# Patient Record
Sex: FEMALE | Race: WHITE | NOT HISPANIC OR LATINO | Employment: PART TIME | URBAN - NONMETROPOLITAN AREA
[De-identification: names, ages, dates, MRNs, and addresses within clinical notes are randomized per-mention and may not be internally consistent; named-entity substitution may affect disease eponyms.]

---

## 2023-10-30 PROBLEM — I48.19 PERSISTENT ATRIAL FIBRILLATION (MULTI): Status: ACTIVE | Noted: 2023-10-30

## 2023-10-30 PROBLEM — I10 ESSENTIAL HYPERTENSION, BENIGN: Status: ACTIVE | Noted: 2023-10-30

## 2023-10-30 RX ORDER — AMLODIPINE BESYLATE 5 MG/1
5 TABLET ORAL DAILY
COMMUNITY
End: 2023-10-31 | Stop reason: ALTCHOICE

## 2023-10-30 RX ORDER — CLONIDINE HYDROCHLORIDE 0.1 MG/1
0.1 TABLET ORAL DAILY
COMMUNITY
End: 2024-02-28 | Stop reason: ALTCHOICE

## 2023-10-30 RX ORDER — VALACYCLOVIR HYDROCHLORIDE 1 G/1
1000 TABLET, FILM COATED ORAL AS NEEDED
COMMUNITY
End: 2024-02-28 | Stop reason: ALTCHOICE

## 2023-10-30 RX ORDER — METOPROLOL SUCCINATE 25 MG/1
25 TABLET, EXTENDED RELEASE ORAL DAILY
COMMUNITY
End: 2024-02-28 | Stop reason: ALTCHOICE

## 2023-10-31 ENCOUNTER — OFFICE VISIT (OUTPATIENT)
Dept: CARDIOLOGY | Facility: CLINIC | Age: 68
End: 2023-10-31
Payer: MEDICARE

## 2023-10-31 VITALS
BODY MASS INDEX: 17.07 KG/M2 | SYSTOLIC BLOOD PRESSURE: 138 MMHG | HEIGHT: 64 IN | DIASTOLIC BLOOD PRESSURE: 80 MMHG | HEART RATE: 60 BPM | WEIGHT: 100 LBS

## 2023-10-31 DIAGNOSIS — I10 ESSENTIAL HYPERTENSION, BENIGN: ICD-10-CM

## 2023-10-31 DIAGNOSIS — Z79.01 ANTICOAGULATED: ICD-10-CM

## 2023-10-31 DIAGNOSIS — I48.19 PERSISTENT ATRIAL FIBRILLATION (MULTI): Primary | ICD-10-CM

## 2023-10-31 PROCEDURE — 3079F DIAST BP 80-89 MM HG: CPT | Performed by: INTERNAL MEDICINE

## 2023-10-31 PROCEDURE — 1159F MED LIST DOCD IN RCRD: CPT | Performed by: INTERNAL MEDICINE

## 2023-10-31 PROCEDURE — 3075F SYST BP GE 130 - 139MM HG: CPT | Performed by: INTERNAL MEDICINE

## 2023-10-31 PROCEDURE — 1160F RVW MEDS BY RX/DR IN RCRD: CPT | Performed by: INTERNAL MEDICINE

## 2023-10-31 PROCEDURE — 3008F BODY MASS INDEX DOCD: CPT | Performed by: INTERNAL MEDICINE

## 2023-10-31 PROCEDURE — 1036F TOBACCO NON-USER: CPT | Performed by: INTERNAL MEDICINE

## 2023-10-31 PROCEDURE — 99214 OFFICE O/P EST MOD 30 MIN: CPT | Performed by: INTERNAL MEDICINE

## 2023-10-31 ASSESSMENT — ENCOUNTER SYMPTOMS
IRREGULAR HEARTBEAT: 1
PALPITATIONS: 1

## 2023-10-31 NOTE — PROGRESS NOTES
"Subjective   Monse Dillon is a 68 y.o. female       Chief Complaint    Follow-up          HPI   Patient is here for follow-up continue management for persistent atrial fibrillation, last time I saw her we put her back on metoprolol and continued Xarelto.  Her GFR based on her body weight, age and creatinine is 30 and she is currently on Xarelto 15 mg daily.  Initially she was placed on Xarelto she had GI bleed and she recently underwent colonoscopy and given the clearance.  She has been on Xarelto for only 1 week.     Assessment     1. Persistent atrial fibrillation.  Xarelto treatment was interrupted due to GI bleed.  She was just given the permission to resume Xarelto by GI about a week ago.  She is on appropriate Xarelto dose based on her estimated GFR and body weight  2. Patient was started on anticoagulation with appropriate dose of Xarelto based on GFR  3. CHADS2 vascular score of 3   4 hypertension controlled     Plan     1. I advised the patient to continue present medical regimen.  I advised the patient that she can try to stop her amlodipine to see her blood pressure remains controlled with a combination of clonidine and metoprolol.  I advised her to continue to monitor her blood pressure at home  2.  I recommended for her to undergo cardioversion in 4 weeks.  Risk, benefit alternative cardioversion discussed with patient she understood and agreed  3. Risk, benefits alternative anticoagulation cardioversion discussed with patient at length she understood and agreed  4.  I reviewed her recent lab work and echocardiogram with her.  Review of Systems   Cardiovascular:  Positive for irregular heartbeat and palpitations.   All other systems reviewed and are negative.         Visit Vitals  /80 (BP Location: Left arm, Patient Position: Sitting)   Pulse 60   Ht 1.626 m (5' 4\")   Wt 45.4 kg (100 lb)   BMI 17.16 kg/m²   Smoking Status Former   BSA 1.43 m²        Objective   Physical Exam  Constitutional:     "   Appearance: Normal appearance. She is normal weight.   HENT:      Nose: Nose normal.   Neck:      Vascular: No carotid bruit.   Cardiovascular:      Rate and Rhythm: Normal rate. Rhythm irregular.      Pulses: Normal pulses.      Heart sounds: Normal heart sounds.   Pulmonary:      Effort: Pulmonary effort is normal.   Abdominal:      General: Bowel sounds are normal.      Palpations: Abdomen is soft.   Genitourinary:     Rectum: Normal.   Musculoskeletal:         General: Normal range of motion.      Cervical back: Normal range of motion.      Right lower leg: No edema.      Left lower leg: No edema.   Skin:     General: Skin is warm and dry.   Neurological:      General: No focal deficit present.      Mental Status: She is alert.   Psychiatric:         Mood and Affect: Mood normal.         Behavior: Behavior normal.         Thought Content: Thought content normal.         Judgment: Judgment normal.         Current Medications    Current Outpatient Medications:     cloNIDine (Catapres) 0.1 mg tablet, Take 1 tablet (0.1 mg) by mouth once daily., Disp: , Rfl:     metoprolol succinate XL (Toprol-XL) 25 mg 24 hr tablet, Take 1 tablet (25 mg) by mouth once daily. Do not crush or chew., Disp: , Rfl:     rivaroxaban (Xarelto) 15 mg tablet, Take 1 tablet (15 mg) by mouth once daily in the evening. Take with meals. Take with food., Disp: , Rfl:     valACYclovir (Valtrex) 1 gram tablet, Take 1 tablet (1,000 mg) by mouth if needed (flare ups)., Disp: , Rfl:                      Assessment/Plan   1. Persistent atrial fibrillation (CMS/HCC)      2. Essential hypertension, benign      3. BMI less than 19,adult      4. Anticoagulated

## 2023-10-31 NOTE — PATIENT INSTRUCTIONS
Please bring all medicines, vitamins, and herbal supplements with you when you come to the office.    Prescriptions will not be filled unless you are compliant with your follow up appointments or have a follow up appointment scheduled as per instruction of your physician. Refills should be requested at the time of your visit.    Patient can stop amlodipine monitor bp at home

## 2023-11-09 DIAGNOSIS — I48.19 PERSISTENT ATRIAL FIBRILLATION (MULTI): Primary | ICD-10-CM

## 2023-11-21 LAB
NON-UH HIE ANION GAP: 9.5 (ref 6–15)
NON-UH HIE CARBON DIOXIDE: 26.9 MMOL/L (ref 21–31)
NON-UH HIE CHLORIDE: 109 MMOL/L (ref 98–107)
NON-UH HIE POTASSIUM: 4.4 MMOL/L (ref 3.5–5.1)
NON-UH HIE SODIUM: 141 MMOL/L (ref 136–145)

## 2024-01-22 ENCOUNTER — TELEPHONE (OUTPATIENT)
Dept: CARDIOLOGY | Facility: CLINIC | Age: 69
End: 2024-01-22

## 2024-01-22 DIAGNOSIS — I48.91 ATRIAL FIBRILLATION, UNSPECIFIED TYPE (MULTI): ICD-10-CM

## 2024-01-22 NOTE — TELEPHONE ENCOUNTER
Patient seen on consult at [facility] on 1/18/2024; Fibrillation.    Plan  Patient can be discharged home on current medication  Assessment  1.  Recurrent/persistent atrial fibrillation with RVR.  Heart rate remains slightly high  2.  Mildly elevated troponin I suspect likely type II event due to atrial fibrillation and severe anemia  3.  Severe anemia.  Patient underwent previous GI evaluation with finding of colonic polyps and hemorrhoids  4.  Hypertension  5.  Moderate mitral regurgitation    Plan    1.  No anticoagulation in view of recurrent bleeding  2.  Patient can be discharged home  3.  Outpatient evaluation for Watchman device- will make referral to

## 2024-01-30 ENCOUNTER — APPOINTMENT (OUTPATIENT)
Dept: CARDIOLOGY | Facility: CLINIC | Age: 69
End: 2024-01-30
Payer: MEDICARE

## 2024-02-28 ENCOUNTER — OFFICE VISIT (OUTPATIENT)
Dept: CARDIOLOGY | Facility: CLINIC | Age: 69
End: 2024-02-28
Payer: MEDICARE

## 2024-02-28 VITALS
HEIGHT: 64 IN | DIASTOLIC BLOOD PRESSURE: 88 MMHG | SYSTOLIC BLOOD PRESSURE: 144 MMHG | WEIGHT: 99 LBS | BODY MASS INDEX: 16.9 KG/M2 | HEART RATE: 72 BPM

## 2024-02-28 DIAGNOSIS — Z79.01 ANTICOAGULATED: ICD-10-CM

## 2024-02-28 DIAGNOSIS — K92.2 RECURRENT GASTROINTESTINAL HEMORRHAGE: ICD-10-CM

## 2024-02-28 DIAGNOSIS — Z87.891 FORMER SMOKER: ICD-10-CM

## 2024-02-28 DIAGNOSIS — I10 ESSENTIAL HYPERTENSION, BENIGN: Primary | ICD-10-CM

## 2024-02-28 DIAGNOSIS — I48.19 PERSISTENT ATRIAL FIBRILLATION (MULTI): ICD-10-CM

## 2024-02-28 PROCEDURE — 93000 ELECTROCARDIOGRAM COMPLETE: CPT | Performed by: INTERNAL MEDICINE

## 2024-02-28 PROCEDURE — 3077F SYST BP >= 140 MM HG: CPT | Performed by: INTERNAL MEDICINE

## 2024-02-28 PROCEDURE — 1160F RVW MEDS BY RX/DR IN RCRD: CPT | Performed by: INTERNAL MEDICINE

## 2024-02-28 PROCEDURE — 1036F TOBACCO NON-USER: CPT | Performed by: INTERNAL MEDICINE

## 2024-02-28 PROCEDURE — 1159F MED LIST DOCD IN RCRD: CPT | Performed by: INTERNAL MEDICINE

## 2024-02-28 PROCEDURE — 3008F BODY MASS INDEX DOCD: CPT | Performed by: INTERNAL MEDICINE

## 2024-02-28 PROCEDURE — 99214 OFFICE O/P EST MOD 30 MIN: CPT | Performed by: INTERNAL MEDICINE

## 2024-02-28 PROCEDURE — 3079F DIAST BP 80-89 MM HG: CPT | Performed by: INTERNAL MEDICINE

## 2024-02-28 RX ORDER — SOTALOL HYDROCHLORIDE 120 MG/1
120 TABLET ORAL EVERY 12 HOURS
COMMUNITY

## 2024-02-28 RX ORDER — DIGOXIN 125 MCG
125 TABLET ORAL DAILY
COMMUNITY

## 2024-02-28 NOTE — PATIENT INSTRUCTIONS
BMI was below normal measurement. Current weight: (!) 44.9 kg (99 lb)  Weight change since last visit (-) denotes wt loss -1 lbs   Weight gain needed to achieve  BMI of 18.5 = 8.5 Lbs        Please bring all medicines, vitamins, and herbal supplements with you when you come to the office.    Prescriptions will not be filled unless you are compliant with your follow up appointments or have a follow up appointment scheduled as per instruction of your physician. Refills should be requested at the time of your visit.

## 2024-02-28 NOTE — PROGRESS NOTES
"Subjective   Monse Dillon is a 68 y.o. female       Chief Complaint    Hospital Follow-up          HPI          Patient is here for follow-up continue management for persistent atrial fibrillation, and recent hospitalization.  Unfortunately she presented with severe anemia and GI bleed and recurrence of her atrial fibrillation.  She was placed on sotalol and hopefully she will convert spontaneously to normal sinus rhythm.  And I also advised her to stop her anticoagulation due to the severity of her anemia.  She was seen by GI and underwent colonoscopy with diagnosis apparently with hemorrhoids.  The patient had repeat CBC recently which apparently was satisfactory.  I did refer her for evaluation for Watchman device.    Assessment     1. Persistent atrial fibrillation.  Xarelto treatment was interrupted due to GI bleed And now on 2 prior occasion.  2.  Intolerance to anticoagulation due to GI bleed  3. CHADS2 vascular score of 3   4 hypertension controlled     Plan     1. I advised the patient to continue present medical regimen.  She remains off anticoagulation anticipating Watchman device in the near future.  2.  I advised her to discuss with her GI physician if her hemorrhoids can be addressed which might allow us to use a short course of anticoagulation to perform cardioversion.  Patient will notify me when she see her GI specialist in the near future  3. Risk, benefits alternative of Watchman device discussed with her at length she understood and agreed  4.  I reviewed her recent hospitalization  5.  I will see her back in 3 to 4 months and follow-up    Review of Systems   All other systems reviewed and are negative.           Vitals:    02/28/24 1515   BP: 144/88   BP Location: Left arm   Patient Position: Sitting   Pulse: 72   Weight: (!) 44.9 kg (99 lb)   Height: 1.626 m (5' 4\")        EKG done in office today     Objective   Physical Exam  Constitutional:       Appearance: Normal appearance.   HENT:      " Nose: Nose normal.   Neck:      Vascular: No carotid bruit.   Cardiovascular:      Rate and Rhythm: Normal rate. Rhythm irregularly irregular.      Pulses: Normal pulses.      Heart sounds: Normal heart sounds.   Pulmonary:      Effort: Pulmonary effort is normal.   Abdominal:      General: Bowel sounds are normal.      Palpations: Abdomen is soft.   Musculoskeletal:         General: Normal range of motion.      Cervical back: Normal range of motion.      Right lower leg: No edema.      Left lower leg: No edema.   Skin:     General: Skin is warm and dry.   Neurological:      General: No focal deficit present.      Mental Status: She is alert.   Psychiatric:         Mood and Affect: Mood normal.         Behavior: Behavior normal.         Thought Content: Thought content normal.         Judgment: Judgment normal.         Allergies  Patient has no known allergies.     Current Medications    Current Outpatient Medications:     digoxin (Lanoxin) 125 MCG tablet, Take 1 tablet (125 mcg) by mouth once daily., Disp: , Rfl:     sotalol (Betapace) 120 mg tablet, Take 1 tablet (120 mg) by mouth every 12 hours., Disp: , Rfl:     rivaroxaban (Xarelto) 15 mg tablet, Take 1 tablet (15 mg) by mouth once daily in the evening. Take with meals. Take with food. (Patient not taking: Reported on 2/28/2024), Disp: 90 tablet, Rfl: 3                     Assessment/Plan   1. Essential hypertension, benign        2. Persistent atrial fibrillation (CMS/HCC)  Follow Up In Cardiology    Follow Up In Cardiology    ECG 12 Lead      3. Anticoagulated  Follow Up In Cardiology      4. Former smoker        5. Recurrent gastrointestinal hemorrhage                 Scribe Attestation  By signing my name below, Kitty GOODWIN LPN, Scribe   attest that this documentation has been prepared under the direction and in the presence of Jamel Cuevas MD.     Provider Attestation - Scribe documentation    All medical record entries made by the Scribe were at  my direction and personally dictated by me. I have reviewed the chart and agree that the record accurately reflects my personal performance of the history, physical exam, discussion and plan.

## 2024-02-28 NOTE — LETTER
February 28, 2024     Maria Luz Polanco, APRN-CNP  [address]  [facility]  [place] [address]    Patient: Monse Dillon   YOB: 1955   Date of Visit: 2/28/2024       Dear Dr. Maria Luz Polanco, APRN-CNP:    Thank you for referring Monse Dillon to me for evaluation. Below are my notes for this consultation.  If you have questions, please do not hesitate to call me. I look forward to following your patient along with you.       Sincerely,     Jamel Cuevas MD      CC: No Recipients  ______________________________________________________________________________________    Subjective   Monse Dillon is a 68 y.o. female       Chief Complaint    Hospital Follow-up          HPI          Patient is here for follow-up continue management for persistent atrial fibrillation, and recent hospitalization.  Unfortunately she presented with severe anemia and GI bleed and recurrence of her atrial fibrillation.  She was placed on sotalol and hopefully she will convert spontaneously to normal sinus rhythm.  And I also advised her to stop her anticoagulation due to the severity of her anemia.  She was seen by GI and underwent colonoscopy with diagnosis apparently with hemorrhoids.  The patient had repeat CBC recently which apparently was satisfactory.  I did refer her for evaluation for Watchman device.    Assessment     1. Persistent atrial fibrillation.  Xarelto treatment was interrupted due to GI bleed And now on 2 prior occasion.  2.  Intolerance to anticoagulation due to GI bleed  3. CHADS2 vascular score of 3   4 hypertension controlled     Plan     1. I advised the patient to continue present medical regimen.  She remains off anticoagulation anticipating Watchman device in the near future.  2.  I advised her to discuss with her GI physician if her hemorrhoids can be addressed which might allow us to use a short course of anticoagulation to perform cardioversion.  Patient will  "notify me when she see her GI specialist in the near future  3. Risk, benefits alternative of Watchman device discussed with her at length she understood and agreed  4.  I reviewed her recent hospitalization  5.  I will see her back in 3 to 4 months and follow-up    Review of Systems   All other systems reviewed and are negative.           Vitals:    02/28/24 1515   BP: 144/88   BP Location: Left arm   Patient Position: Sitting   Pulse: 72   Weight: (!) 44.9 kg (99 lb)   Height: 1.626 m (5' 4\")        EKG done in office today     Objective   Physical Exam  Constitutional:       Appearance: Normal appearance.   HENT:      Nose: Nose normal.   Neck:      Vascular: No carotid bruit.   Cardiovascular:      Rate and Rhythm: Normal rate. Rhythm irregularly irregular.      Pulses: Normal pulses.      Heart sounds: Normal heart sounds.   Pulmonary:      Effort: Pulmonary effort is normal.   Abdominal:      General: Bowel sounds are normal.      Palpations: Abdomen is soft.   Musculoskeletal:         General: Normal range of motion.      Cervical back: Normal range of motion.      Right lower leg: No edema.      Left lower leg: No edema.   Skin:     General: Skin is warm and dry.   Neurological:      General: No focal deficit present.      Mental Status: She is alert.   Psychiatric:         Mood and Affect: Mood normal.         Behavior: Behavior normal.         Thought Content: Thought content normal.         Judgment: Judgment normal.         Allergies  Patient has no known allergies.     Current Medications    Current Outpatient Medications:   •  digoxin (Lanoxin) 125 MCG tablet, Take 1 tablet (125 mcg) by mouth once daily., Disp: , Rfl:   •  sotalol (Betapace) 120 mg tablet, Take 1 tablet (120 mg) by mouth every 12 hours., Disp: , Rfl:   •  rivaroxaban (Xarelto) 15 mg tablet, Take 1 tablet (15 mg) by mouth once daily in the evening. Take with meals. Take with food. (Patient not taking: Reported on 2/28/2024), Disp: 90 " tablet, Rfl: 3                     Assessment/Plan   1. Essential hypertension, benign        2. Persistent atrial fibrillation (CMS/HCC)  Follow Up In Cardiology    Follow Up In Cardiology    ECG 12 Lead      3. Anticoagulated  Follow Up In Cardiology      4. Former smoker        5. Recurrent gastrointestinal hemorrhage                 Scribe Attestation  By signing my name below, Kitty GOODWIN LPN, Scribe   attest that this documentation has been prepared under the direction and in the presence of Jamel Cuevas MD.     Provider Attestation - Scribe documentation    All medical record entries made by the Scribe were at my direction and personally dictated by me. I have reviewed the chart and agree that the record accurately reflects my personal performance of the history, physical exam, discussion and plan.

## 2024-04-09 NOTE — PROGRESS NOTES
Cardio: Dr. Faith GOODWIN was asked by Dr. Cuevas to evaluate this patient in consultation for evaluation of left atrial appendage closure.    The patient is a 68-year-old female with hypertension and paroxysmal atrial fibrillation diagnosed a year ago.  Patient underwent DC cardioversion in November 2023 and was initially anticoagulated with Xarelto and managed with a rhythm control strategy with sotalol.. The patient has normal LVEF with left ventricular ejection fraction of 55 to 60% based on transthoracic echocardiogram January 2024.  She has no history of significant valvular heart disease..    Patient has no prior history of TIA or CVA.    Unfortunately the patient's course has been notable for symptomatic anemia necessitating hospitalization in January 2024.  She underwent EGD and colonoscopy which was notable for internal hemorrhoids.  She is scheduled to have a capsule endoscopy in May.  She was managed with iron infusions but did not require packed red blood cell transfusion.  At the time of her admission for symptomatic anemia she was taken off of anticoagulant therapy.    She denies any overt melena, hematochezia or hematemesis.  Patient is currently feeling stronger.    Given the patient's significant severe symptomatic anemia,  the patient is referred for consideration of left atrial appendage closure for stroke risk reduction.       ROS:  Constitutional: fatigue with anemia.  Feeling better now.  Eyes: no acute eye problems, no blurred vision, no diplopia, no eye pain  ENT:  no acute hearing loss, no earache, no sore throat  Cardiovascular: no dyspnea on exertion, no chest pain  Respiratory: no chronic cough, not coughing up sputum, no wheezing that is consistent with asthma  Gastrointestinal: no acute bowel complaints  Musculoskeletal: no acute arthralgias, no acute myalgias, no acute joint swelling  Skin: no skin rashes, no change in skin color and pigmentation, no skin lesions and no skin  lumps.   Neurological: no headaches, no dizziness, no tingling, no fainting and no limb weakness.   Psychiatric:  no suicidal ideation, no confusion, no personality change and no emotional problems.   Hematologic/Lymphatic: As noted above, other then mentioned in HPI  All other systems have been reviewed and are negative for complaint.     Physical Exam:     Visit Vitals  Smoking Status Former        Constitutional: alert and in no acute distress.   Eyes: no erythema, swelling or discharge from the eye .   Ears, Nose, Mouth, and Throat: external inspection of ears and nose is normal , lips, teeth, and gums are normal with good dentition  and oropharynx normal with no erythema, edema, exudate or lesions .   Neck: neck is supple, symmetric, trachea midline, no masses  and no thyromegaly .   Pulmonary: no increased work of breathing or signs of respiratory distress , lungs clear to auscultation. , normal percussion of chest  and chest palpation normal .   Cardiovascular: RRR, no murmur,  no leg edema, non-displaced PMI, no S3 or S4  Abdomen: abdomen non-tender, no masses  and no hepatomegaly .           Skin:  no skin lesions          Neurologic: non-focal neurologic examination.      Psychiatric judgment and insight is normal , oriented to person, place and time , normal mood and affect .       Labs:    Results for orders placed or performed in visit on 11/21/23   [facility] Electrolytes   Result Value Ref Range    [facility] Sodium 141 136 - 145 mmol/L    [facility] Potassium 4.4 3.5 - 5.1 mmol/L    [facility] Chloride 109 (H) 98 - 107 mmol/L    [facility] Carbon Dioxide 26.9 21.0 - 31.0 mmol/L    [facility] Anion Gap 9.5 6.0 - 15.0          Medications:    Current Outpatient Medications   Medication Instructions    digoxin (LANOXIN) 125 mcg, oral, Daily    rivaroxaban (XARELTO) 15 mg, oral, Daily with evening meal, Take with food.    sotalol (BETAPACE) 120 mg, oral, Every 12 hours          Assessment:      This is a  very pleasant 68-year-old female with paroxysmal atrial fibrillation hypertension and severe symptomatic anemia.  Etiology of anemia may be due to GI bleeding.  She seems to have improved after having been taken off of anticoagulant therapy and managed with iron infusions but of course is at risk for recurrent bleeding should she be placed back on anticoagulant therapy.    The CHADS-VASC score is 3 and HAS-BLED score is 4. The patient is at increased risk of both bleeding and stroke.  As such the patient is a reasonable candidate for consideration of left atrial appendage occluder placement.    Today we discussed the left atrial appendage closure procedure. The patient was given written educational handout materials and watched an educational video. All risks, benefits and alternative were discussed.     The risks discussed included but were not limited to vascular complications, sedation related complications, risk of MI, CVA, device embolization, pericardial tamponade and death. The patient verbalized understanding and decided to proceed.      Preprocedural planning will be performed with cardiac CT performed 1 week prior for device sizing and to exclude left atrial appendage thrombus.  Of course, the patient will also need a CT scan/HELEN 4 months after the procedure, to evaluate the device for position, thrombus and tim-device leak. Following device implant, strategy will be for dual antiplatelet therapy with aspirin and clopidogrel for 6 months then aspirin for life.     Thank you, Dr. Cuevas, for this consultation and for allowing me to participate in the care of this patient.

## 2024-04-10 ENCOUNTER — OFFICE VISIT (OUTPATIENT)
Dept: CARDIOLOGY | Facility: CLINIC | Age: 69
End: 2024-04-10
Payer: MEDICARE

## 2024-04-10 VITALS
BODY MASS INDEX: 16.73 KG/M2 | SYSTOLIC BLOOD PRESSURE: 151 MMHG | OXYGEN SATURATION: 98 % | DIASTOLIC BLOOD PRESSURE: 106 MMHG | HEART RATE: 87 BPM | RESPIRATION RATE: 16 BRPM | TEMPERATURE: 95.1 F | WEIGHT: 98 LBS | HEIGHT: 64 IN

## 2024-04-10 DIAGNOSIS — I48.91 ATRIAL FIBRILLATION, UNSPECIFIED TYPE (MULTI): ICD-10-CM

## 2024-04-10 PROCEDURE — 1159F MED LIST DOCD IN RCRD: CPT | Performed by: INTERNAL MEDICINE

## 2024-04-10 PROCEDURE — 3008F BODY MASS INDEX DOCD: CPT | Performed by: INTERNAL MEDICINE

## 2024-04-10 PROCEDURE — 99204 OFFICE O/P NEW MOD 45 MIN: CPT | Performed by: INTERNAL MEDICINE

## 2024-04-10 PROCEDURE — 1160F RVW MEDS BY RX/DR IN RCRD: CPT | Performed by: INTERNAL MEDICINE

## 2024-04-10 PROCEDURE — 99214 OFFICE O/P EST MOD 30 MIN: CPT | Performed by: INTERNAL MEDICINE

## 2024-04-10 PROCEDURE — 3077F SYST BP >= 140 MM HG: CPT | Performed by: INTERNAL MEDICINE

## 2024-04-10 PROCEDURE — 3080F DIAST BP >= 90 MM HG: CPT | Performed by: INTERNAL MEDICINE

## 2024-04-10 PROCEDURE — 1036F TOBACCO NON-USER: CPT | Performed by: INTERNAL MEDICINE

## 2024-04-12 ENCOUNTER — TELEPHONE (OUTPATIENT)
Dept: CARDIOLOGY | Facility: HOSPITAL | Age: 69
End: 2024-04-12
Payer: MEDICARE

## 2024-04-16 ENCOUNTER — TELEPHONE (OUTPATIENT)
Dept: CARDIOLOGY | Facility: HOSPITAL | Age: 69
End: 2024-04-16
Payer: MEDICARE

## 2024-04-16 NOTE — TELEPHONE ENCOUNTER
RN called regarding scheduling [facility] and pt stated she cannot proceed at the moment due to an active bleed and would like to follow up in early MAY, RN will follow up.

## 2024-05-21 ENCOUNTER — OFFICE VISIT (OUTPATIENT)
Dept: CARDIOLOGY | Facility: CLINIC | Age: 69
End: 2024-05-21
Payer: MEDICARE

## 2024-05-21 VITALS
HEIGHT: 64 IN | DIASTOLIC BLOOD PRESSURE: 76 MMHG | SYSTOLIC BLOOD PRESSURE: 134 MMHG | HEART RATE: 59 BPM | BODY MASS INDEX: 16.87 KG/M2 | WEIGHT: 98.8 LBS

## 2024-05-21 DIAGNOSIS — K92.2 RECURRENT GASTROINTESTINAL HEMORRHAGE: ICD-10-CM

## 2024-05-21 DIAGNOSIS — Z87.891 FORMER SMOKER: ICD-10-CM

## 2024-05-21 DIAGNOSIS — I10 ESSENTIAL HYPERTENSION, BENIGN: Primary | ICD-10-CM

## 2024-05-21 DIAGNOSIS — Z79.01 ANTICOAGULATED: ICD-10-CM

## 2024-05-21 DIAGNOSIS — I48.19 PERSISTENT ATRIAL FIBRILLATION (MULTI): ICD-10-CM

## 2024-05-21 PROCEDURE — 99214 OFFICE O/P EST MOD 30 MIN: CPT | Performed by: INTERNAL MEDICINE

## 2024-05-21 PROCEDURE — 3008F BODY MASS INDEX DOCD: CPT | Performed by: INTERNAL MEDICINE

## 2024-05-21 PROCEDURE — 3078F DIAST BP <80 MM HG: CPT | Performed by: INTERNAL MEDICINE

## 2024-05-21 PROCEDURE — 1160F RVW MEDS BY RX/DR IN RCRD: CPT | Performed by: INTERNAL MEDICINE

## 2024-05-21 PROCEDURE — 1159F MED LIST DOCD IN RCRD: CPT | Performed by: INTERNAL MEDICINE

## 2024-05-21 PROCEDURE — 3075F SYST BP GE 130 - 139MM HG: CPT | Performed by: INTERNAL MEDICINE

## 2024-05-21 PROCEDURE — 93000 ELECTROCARDIOGRAM COMPLETE: CPT | Performed by: INTERNAL MEDICINE

## 2024-05-21 PROCEDURE — 1036F TOBACCO NON-USER: CPT | Performed by: INTERNAL MEDICINE

## 2024-05-21 NOTE — PROGRESS NOTES
"Subjective   Monse Dillon is a 68 y.o. female       Chief Complaint    Follow-up          HPI        Patient is here for follow-up and continue management for persistent atrial fibrillation with documented intolerance to anticoagulation.  I did refer her for evaluation for Watchman device and she was felt to be a good candidate.  Apparently she is waiting to get the permission from her gastroenterologist to be on aspirin and Plavix therapy.  She remained in atrial fibrillation.  She denies complaint of chest pain, palpitation, lightheadedness, dizziness or syncope.  His recent labs showed mild hyperlipidemia but I do not believe she meet the criteria for lipid therapy.    Assessment     1. Persistent atrial fibrillation.  Heart rate controlled.  Unable to tolerate anticoagulation due to recurrent GI bleed  2.  Intolerance to anticoagulation due to GI bleed  3. CHADS2 vascular score of 3   4 hypertension controlled     Plan     1. I advised the patient to continue present medical regimen.  S  2.  The patient was referred for Watchman device.  She is awaiting the permission from her gastroenterologist to be on aspirin and Plavix and she will notify Dr. Kessler as to the timing of proceeding with Watchman device  3. Risk, benefits alternative of Watchman device discussed with her at length she understood and agreed  4.  I reviewed her recent hospitalization  5.  I will see her back in 6 months  Review of Systems   All other systems reviewed and are negative.           Vitals:    05/21/24 1521   BP: 134/76   BP Location: Left arm   Patient Position: Sitting   Pulse: 59   Weight: (!) 44.8 kg (98 lb 12.8 oz)   Height: 1.626 m (5' 4\")      EKG done in office today     Objective   Physical Exam  Constitutional:       Appearance: Normal appearance.   HENT:      Nose: Nose normal.   Neck:      Vascular: No carotid bruit.   Cardiovascular:      Rate and Rhythm: Normal rate. Rhythm irregularly irregular.      Pulses: Normal " pulses.      Heart sounds: Normal heart sounds.   Pulmonary:      Effort: Pulmonary effort is normal.   Abdominal:      General: Bowel sounds are normal.      Palpations: Abdomen is soft.   Musculoskeletal:         General: Normal range of motion.      Cervical back: Normal range of motion.      Right lower leg: No edema.      Left lower leg: No edema.   Skin:     General: Skin is warm and dry.   Neurological:      General: No focal deficit present.      Mental Status: She is alert.   Psychiatric:         Mood and Affect: Mood normal.         Behavior: Behavior normal.         Thought Content: Thought content normal.         Judgment: Judgment normal.         Allergies  Patient has no known allergies.     Current Medications    Current Outpatient Medications:     digoxin (Lanoxin) 125 MCG tablet, Take 1 tablet (125 mcg) by mouth once daily., Disp: , Rfl:     sotalol (Betapace) 120 mg tablet, Take 1 tablet (120 mg) by mouth every 12 hours., Disp: , Rfl:     rivaroxaban (Xarelto) 15 mg tablet, Take 1 tablet (15 mg) by mouth once daily in the evening. Take with meals. Take with food. (Patient not taking: Reported on 2/28/2024), Disp: 90 tablet, Rfl: 3                     Assessment/Plan   1. Essential hypertension, benign        2. Persistent atrial fibrillation (Multi)  Follow Up In Cardiology    Follow Up In Cardiology    ECG 12 Lead      3. Anticoagulated        4. BMI less than 19,adult        5. Recurrent gastrointestinal hemorrhage        6. Former smoker                 Scribe Attestation  By signing my name below, Vivian GOODWIN LPN, Scribe   attest that this documentation has been prepared under the direction and in the presence of Jamel Cuevas MD.     Provider Attestation - Scribe documentation    All medical record entries made by the Scribe were at my direction and personally dictated by me. I have reviewed the chart and agree that the record accurately reflects my personal performance of the history,  physical exam, discussion and plan.                                   Shared Decision Tool - Referral for Left Atrial Appendage Occlusion    My patient Monse Dillon 1955 has been evaluated by me and is referred to [facility] for a left atrial appendage implant due thromboembolic stroke risk from atrial fibrillation with CHADS2 score >= 2 or a [id number] score >= 3.    This patient is not a good candidate for long term anticoagulation for the following reason(s):    This patient however should be able to tolerate short term anticoagulation as necessary for LAAO device implant.  My Patient and I, the referring physician, have reviewed the following:  Yes  Atrial Fibrillation increases the risk of stroke.  Yes Anticoagulants or blood thinners help reduce the risk of stroke for most people.  Yes    Blood thinners may cause minor or serious bleeding issues.  Yes  Blood thinners include the medications aspirin, warfarin, and NOAC (dabigatran, edoxaban, rivaroxaban, apixaban...), each with unique risk and safety profiles.  Yes We reviewed his/her anticoagulation history and previous experiences.  Yes We discussed lack of other alternative treatments available to prevent stroke risk.  Yes We discussed the LAAO device implant vs taking anticoagulation to reduce stroke risk.  Yes We referred the patient to a LAAO outpatient clinic for further explanation and consideration.          Yes The LAAO device has been adequately explained along with the risks and benefits of treatment. Alternative treatment has been discussed with all questions answered. After discussion of the above considerations, it has been decided to be evaluated for the LAAO therapies.    Reviewed and approved by  on 1/14/25 at 1:40 PM.

## 2024-05-21 NOTE — PATIENT INSTRUCTIONS
Please bring all medicines, vitamins, and herbal supplements with you when you come to the office.    Prescriptions will not be filled unless you are compliant with your follow up appointments or have a follow up appointment scheduled as per instruction of your physician. Refills should be requested at the time of your visit.     BMI was below normal measurement. Current weight: (!) 44.8 kg (98 lb 12.8 oz)  Weight change since last visit (-) denotes wt loss 0.8 lbs   Weight gain needed to achieve  BMI of 18.5 = 8.7 Lbs    Provided dietary education for weight gain to the patient.

## 2024-05-21 NOTE — LETTER
May 21, 2024     Maria Luz Polanco, APRN-CNP  [address]  [facility]  [place] [address]    Patient: Monse Dillon   YOB: 1955   Date of Visit: 5/21/2024       Dear Dr. Maria Luz Polanco, APRN-CNP:    Thank you for referring Monse Dillon to me for evaluation. Below are my notes for this consultation.  If you have questions, please do not hesitate to call me. I look forward to following your patient along with you.       Sincerely,     Jamel Cuevas MD      CC: No Recipients  ______________________________________________________________________________________    Subjective   Monse Dillon is a 68 y.o. female       Chief Complaint    Follow-up          HPI        Patient is here for follow-up and continue management for persistent atrial fibrillation with documented intolerance to anticoagulation.  I did refer her for evaluation for Watchman device and she was felt to be a good candidate.  Apparently she is waiting to get the permission from her gastroenterologist to be on aspirin and Plavix therapy.  She remained in atrial fibrillation.  She denies complaint of chest pain, palpitation, lightheadedness, dizziness or syncope.  His recent labs showed mild hyperlipidemia but I do not believe she meet the criteria for lipid therapy.    Assessment     1. Persistent atrial fibrillation.  Heart rate controlled.  Unable to tolerate anticoagulation due to recurrent GI bleed  2.  Intolerance to anticoagulation due to GI bleed  3. CHADS2 vascular score of 3   4 hypertension controlled     Plan     1. I advised the patient to continue present medical regimen.  S  2.  The patient was referred for Watchman device.  She is awaiting the permission from her gastroenterologist to be on aspirin and Plavix and she will notify Dr. Kessler as to the timing of proceeding with Watchman device  3. Risk, benefits alternative of Watchman device discussed with her at length she understood and  "agreed  4.  I reviewed her recent hospitalization  5.  I will see her back in 6 months  Review of Systems   All other systems reviewed and are negative.           Vitals:    05/21/24 1521   BP: 134/76   BP Location: Left arm   Patient Position: Sitting   Pulse: 59   Weight: (!) 44.8 kg (98 lb 12.8 oz)   Height: 1.626 m (5' 4\")      EKG done in office today     Objective   Physical Exam  Constitutional:       Appearance: Normal appearance.   HENT:      Nose: Nose normal.   Neck:      Vascular: No carotid bruit.   Cardiovascular:      Rate and Rhythm: Normal rate. Rhythm irregularly irregular.      Pulses: Normal pulses.      Heart sounds: Normal heart sounds.   Pulmonary:      Effort: Pulmonary effort is normal.   Abdominal:      General: Bowel sounds are normal.      Palpations: Abdomen is soft.   Musculoskeletal:         General: Normal range of motion.      Cervical back: Normal range of motion.      Right lower leg: No edema.      Left lower leg: No edema.   Skin:     General: Skin is warm and dry.   Neurological:      General: No focal deficit present.      Mental Status: She is alert.   Psychiatric:         Mood and Affect: Mood normal.         Behavior: Behavior normal.         Thought Content: Thought content normal.         Judgment: Judgment normal.         Allergies  Patient has no known allergies.     Current Medications    Current Outpatient Medications:   •  digoxin (Lanoxin) 125 MCG tablet, Take 1 tablet (125 mcg) by mouth once daily., Disp: , Rfl:   •  sotalol (Betapace) 120 mg tablet, Take 1 tablet (120 mg) by mouth every 12 hours., Disp: , Rfl:   •  rivaroxaban (Xarelto) 15 mg tablet, Take 1 tablet (15 mg) by mouth once daily in the evening. Take with meals. Take with food. (Patient not taking: Reported on 2/28/2024), Disp: 90 tablet, Rfl: 3                     Assessment/Plan   1. Essential hypertension, benign        2. Persistent atrial fibrillation (Multi)  Follow Up In Cardiology    Follow Up " In Cardiology    ECG 12 Lead      3. Anticoagulated        4. BMI less than 19,adult        5. Recurrent gastrointestinal hemorrhage        6. Former smoker                 Scribe Attestation  By signing my name below, Vivian GOODWIN LPN, Scribe   attest that this documentation has been prepared under the direction and in the presence of Jamel Cuevas MD.     Provider Attestation - Scribe documentation    All medical record entries made by the Scribe were at my direction and personally dictated by me. I have reviewed the chart and agree that the record accurately reflects my personal performance of the history, physical exam, discussion and plan.

## 2024-05-29 ENCOUNTER — TELEPHONE (OUTPATIENT)
Dept: CARDIOLOGY | Facility: CLINIC | Age: 69
End: 2024-05-29
Payer: MEDICARE

## 2024-05-29 DIAGNOSIS — I48.19 PERSISTENT ATRIAL FIBRILLATION (MULTI): ICD-10-CM

## 2024-05-29 DIAGNOSIS — Z79.01 ANTICOAGULATED: ICD-10-CM

## 2024-05-29 NOTE — TELEPHONE ENCOUNTER
Patient phoned recently had surgical procedure done with , he cleared he to take bllod thinners again. Is scheduled to had Watchman within the next month. Inquiring if she should start blood thinner again since she is now cleared to take.   Records requested.     To Dr. Jamel Cuevas MD for review.

## 2024-05-30 RX ORDER — CLOPIDOGREL BISULFATE 75 MG/1
75 TABLET ORAL DAILY
Qty: 30 TABLET | Refills: 11 | Status: SHIPPED | OUTPATIENT
Start: 2024-05-30 | End: 2025-05-30

## 2024-05-30 RX ORDER — ASPIRIN 81 MG/1
81 TABLET ORAL DAILY
COMMUNITY

## 2024-06-07 ENCOUNTER — TELEPHONE (OUTPATIENT)
Dept: CARDIOLOGY | Facility: HOSPITAL | Age: 69
End: 2024-06-07
Payer: MEDICARE

## 2024-06-07 DIAGNOSIS — I48.91 ATRIAL FIBRILLATION, UNSPECIFIED TYPE (MULTI): ICD-10-CM

## 2024-07-05 ENCOUNTER — HOSPITAL ENCOUNTER (OUTPATIENT)
Dept: RADIOLOGY | Facility: CLINIC | Age: 69
Discharge: HOME | End: 2024-07-05
Payer: MEDICARE

## 2024-07-05 DIAGNOSIS — I48.91 ATRIAL FIBRILLATION, UNSPECIFIED TYPE (MULTI): ICD-10-CM

## 2024-07-05 PROCEDURE — 75572 CT HRT W/3D IMAGE: CPT

## 2024-07-05 PROCEDURE — 2500000004 HC RX 250 GENERAL PHARMACY W/ HCPCS (ALT 636 FOR OP/ED): Performed by: INTERNAL MEDICINE

## 2024-07-05 PROCEDURE — 2550000001 HC RX 255 CONTRASTS: Performed by: INTERNAL MEDICINE

## 2024-07-08 PROBLEM — I48.91 ATRIAL FIBRILLATION, UNSPECIFIED TYPE (MULTI): Status: ACTIVE | Noted: 2024-07-08

## 2024-07-08 NOTE — DISCHARGE INSTRUCTIONS
Anticoagulation Plan: Plavix and 81mg Aspirin for 6 months, then aspirin for life    Do not drive or lift anything heavier than 10lbs for 1 week, or until groin is healed    You will have CT imaging completed in 4 months to assess the effectiveness of the Watchman Device. You will receive a call (within 45 days) in regards to timing of 4 month CT.     Please follow up with your primary cardiologist or PCP in 1-2 weeks     Wound Care:  If slight bleeding should occur at site, lie down and have someone apply firm pressure just above the puncture site for 5 minutes.  If it continues or is profuse, call 911. Always notify your doctor if bleeding occurs.   Keep site clean and dry. Let air dry or you may use a simple bandaid.   Gently cleanse the puncture site in your groin with soap and water only.   You may experience some tenderness, bruising or minimal inflammation.  If you have any concerns, you may contact the Cath Lab or if any of these symptoms become excessive, contact your cardiologist or go to the emergency room.   No tub baths, soaking, or swimming for one week.   May shower the next day after your procedure.    No elective dental procedures or cleanings for 3 months post procedure  You will need dental prophylaxis prior to dental work/cleanings for 6 months     Please call our nurse line for any questions or concerns: [phone]

## 2024-07-12 ENCOUNTER — APPOINTMENT (OUTPATIENT)
Dept: CARDIOLOGY | Facility: HOSPITAL | Age: 69
DRG: 274 | End: 2024-07-12
Payer: MEDICARE

## 2024-07-12 ENCOUNTER — HOSPITAL ENCOUNTER (OUTPATIENT)
Facility: HOSPITAL | Age: 69
Setting detail: SURGERY ADMIT
Discharge: HOME | DRG: 274 | End: 2024-07-12
Attending: INTERNAL MEDICINE | Admitting: INTERNAL MEDICINE
Payer: MEDICARE

## 2024-07-12 VITALS
HEART RATE: 52 BPM | DIASTOLIC BLOOD PRESSURE: 84 MMHG | SYSTOLIC BLOOD PRESSURE: 148 MMHG | OXYGEN SATURATION: 99 % | RESPIRATION RATE: 17 BRPM

## 2024-07-12 DIAGNOSIS — Z95.818 PRESENCE OF WATCHMAN LEFT ATRIAL APPENDAGE CLOSURE DEVICE: ICD-10-CM

## 2024-07-12 DIAGNOSIS — Z09 POSTOP CHECK: ICD-10-CM

## 2024-07-12 DIAGNOSIS — I48.11 LONGSTANDING PERSISTENT ATRIAL FIBRILLATION (MULTI): ICD-10-CM

## 2024-07-12 DIAGNOSIS — I48.91 ATRIAL FIBRILLATION, UNSPECIFIED TYPE (MULTI): Primary | ICD-10-CM

## 2024-07-12 DIAGNOSIS — Z01.810 PREOP CARDIOVASCULAR EXAM: ICD-10-CM

## 2024-07-12 LAB
ACT BLD: 345 SEC (ref 83–199)
ATRIAL RATE: 300 BPM
EJECTION FRACTION: 58 %
EJECTION FRACTION: 70 %
Q ONSET: 225 MS
QRS COUNT: 15 BEATS
QRS DURATION: 112 MS
QT INTERVAL: 414 MS
QTC CALCULATION(BAZETT): 500 MS
QTC FREDERICIA: 470 MS
R AXIS: 78 DEGREES
T AXIS: -53 DEGREES
T OFFSET: 432 MS
VENTRICULAR RATE: 88 BPM

## 2024-07-12 PROCEDURE — 7100000009 HC PHASE TWO TIME - INITIAL BASE CHARGE: Performed by: INTERNAL MEDICINE

## 2024-07-12 PROCEDURE — 85347 COAGULATION TIME ACTIVATED: CPT | Performed by: INTERNAL MEDICINE

## 2024-07-12 PROCEDURE — 99153 MOD SED SAME PHYS/QHP EA: CPT | Performed by: INTERNAL MEDICINE

## 2024-07-12 PROCEDURE — 2720000007 HC OR 272 NO HCPCS: Performed by: INTERNAL MEDICINE

## 2024-07-12 PROCEDURE — 93662 INTRACARDIAC ECG (ICE): CPT | Performed by: INTERNAL MEDICINE

## 2024-07-12 PROCEDURE — C1894 INTRO/SHEATH, NON-LASER: HCPCS | Performed by: INTERNAL MEDICINE

## 2024-07-12 PROCEDURE — 2500000004 HC RX 250 GENERAL PHARMACY W/ HCPCS (ALT 636 FOR OP/ED): Performed by: INTERNAL MEDICINE

## 2024-07-12 PROCEDURE — G0269 OCCLUSIVE DEVICE IN VEIN ART: HCPCS | Mod: TC,59 | Performed by: INTERNAL MEDICINE

## 2024-07-12 PROCEDURE — 99152 MOD SED SAME PHYS/QHP 5/>YRS: CPT | Performed by: INTERNAL MEDICINE

## 2024-07-12 PROCEDURE — 7100000010 HC PHASE TWO TIME - EACH INCREMENTAL 1 MINUTE: Performed by: INTERNAL MEDICINE

## 2024-07-12 PROCEDURE — 85347 COAGULATION TIME ACTIVATED: CPT

## 2024-07-12 PROCEDURE — 93308 TTE F-UP OR LMTD: CPT | Performed by: STUDENT IN AN ORGANIZED HEALTH CARE EDUCATION/TRAINING PROGRAM

## 2024-07-12 PROCEDURE — C1889 IMPLANT/INSERT DEVICE, NOC: HCPCS | Performed by: INTERNAL MEDICINE

## 2024-07-12 PROCEDURE — 1200000002 HC GENERAL ROOM WITH TELEMETRY DAILY

## 2024-07-12 PROCEDURE — 2500000005 HC RX 250 GENERAL PHARMACY W/O HCPCS: Performed by: INTERNAL MEDICINE

## 2024-07-12 PROCEDURE — 33340 PERQ CLSR TCAT L ATR APNDGE: CPT | Performed by: INTERNAL MEDICINE

## 2024-07-12 PROCEDURE — 2780000003 HC OR 278 NO HCPCS: Performed by: INTERNAL MEDICINE

## 2024-07-12 PROCEDURE — 02L73DK OCCLUSION OF LEFT ATRIAL APPENDAGE WITH INTRALUMINAL DEVICE, PERCUTANEOUS APPROACH: ICD-10-PCS | Performed by: INTERNAL MEDICINE

## 2024-07-12 PROCEDURE — 2550000001 HC RX 255 CONTRASTS: Performed by: INTERNAL MEDICINE

## 2024-07-12 PROCEDURE — 93308 TTE F-UP OR LMTD: CPT

## 2024-07-12 PROCEDURE — 93005 ELECTROCARDIOGRAM TRACING: CPT

## 2024-07-12 PROCEDURE — 2500000001 HC RX 250 WO HCPCS SELF ADMINISTERED DRUGS (ALT 637 FOR MEDICARE OP): Performed by: INTERNAL MEDICINE

## 2024-07-12 PROCEDURE — C1760 CLOSURE DEV, VASC: HCPCS | Performed by: INTERNAL MEDICINE

## 2024-07-12 PROCEDURE — C1893 INTRO/SHEATH, FIXED,NON-PEEL: HCPCS | Performed by: INTERNAL MEDICINE

## 2024-07-12 PROCEDURE — C1759 CATH, INTRA ECHOCARDIOGRAPHY: HCPCS | Performed by: INTERNAL MEDICINE

## 2024-07-12 DEVICE — LEFT ATRIAL APPENDAGE CLOSURE DEVICE WITH DELIVERY SYSTEM
Type: IMPLANTABLE DEVICE | Site: HEART | Status: FUNCTIONAL
Brand: WATCHMAN FLX™ PRO

## 2024-07-12 RX ORDER — ACETAMINOPHEN 325 MG/1
650 TABLET ORAL EVERY 6 HOURS PRN
COMMUNITY
Start: 2024-07-12

## 2024-07-12 RX ORDER — MIDAZOLAM HYDROCHLORIDE 1 MG/ML
INJECTION, SOLUTION INTRAMUSCULAR; INTRAVENOUS AS NEEDED
Status: DISCONTINUED | OUTPATIENT
Start: 2024-07-12 | End: 2024-07-12 | Stop reason: HOSPADM

## 2024-07-12 RX ORDER — CLOPIDOGREL BISULFATE 75 MG/1
75 TABLET ORAL DAILY
Status: DISCONTINUED | OUTPATIENT
Start: 2024-07-12 | End: 2024-07-12 | Stop reason: HOSPADM

## 2024-07-12 RX ORDER — CLOPIDOGREL BISULFATE 75 MG/1
TABLET ORAL AS NEEDED
Status: DISCONTINUED | OUTPATIENT
Start: 2024-07-12 | End: 2024-07-12 | Stop reason: HOSPADM

## 2024-07-12 RX ORDER — ASPIRIN 81 MG/1
81 TABLET ORAL DAILY
Status: DISCONTINUED | OUTPATIENT
Start: 2024-07-12 | End: 2024-07-12 | Stop reason: HOSPADM

## 2024-07-12 RX ORDER — PROCHLORPERAZINE MALEATE 10 MG
10 TABLET ORAL EVERY 6 HOURS PRN
Status: DISCONTINUED | OUTPATIENT
Start: 2024-07-12 | End: 2024-07-12 | Stop reason: HOSPADM

## 2024-07-12 RX ORDER — NAPROXEN SODIUM 220 MG/1
324 TABLET, FILM COATED ORAL ONCE
Status: DISCONTINUED | OUTPATIENT
Start: 2024-07-12 | End: 2024-07-12 | Stop reason: HOSPADM

## 2024-07-12 RX ORDER — PROCHLORPERAZINE EDISYLATE 5 MG/ML
10 INJECTION INTRAMUSCULAR; INTRAVENOUS EVERY 6 HOURS PRN
Status: DISCONTINUED | OUTPATIENT
Start: 2024-07-12 | End: 2024-07-12 | Stop reason: HOSPADM

## 2024-07-12 RX ORDER — LIDOCAINE HYDROCHLORIDE 20 MG/ML
INJECTION, SOLUTION INFILTRATION; PERINEURAL AS NEEDED
Status: DISCONTINUED | OUTPATIENT
Start: 2024-07-12 | End: 2024-07-12 | Stop reason: HOSPADM

## 2024-07-12 RX ORDER — TRAMADOL HYDROCHLORIDE 50 MG/1
50 TABLET ORAL EVERY 6 HOURS PRN
Status: DISCONTINUED | OUTPATIENT
Start: 2024-07-12 | End: 2024-07-12 | Stop reason: HOSPADM

## 2024-07-12 RX ORDER — HEPARIN SODIUM 1000 [USP'U]/ML
INJECTION, SOLUTION INTRAVENOUS; SUBCUTANEOUS AS NEEDED
Status: DISCONTINUED | OUTPATIENT
Start: 2024-07-12 | End: 2024-07-12 | Stop reason: HOSPADM

## 2024-07-12 RX ORDER — SODIUM CHLORIDE, SODIUM LACTATE, POTASSIUM CHLORIDE, CALCIUM CHLORIDE 600; 310; 30; 20 MG/100ML; MG/100ML; MG/100ML; MG/100ML
75 INJECTION, SOLUTION INTRAVENOUS CONTINUOUS
Status: SHIPPED | OUTPATIENT
Start: 2024-07-12 | End: 2024-07-12

## 2024-07-12 RX ORDER — ACETAMINOPHEN 325 MG/1
650 TABLET ORAL EVERY 6 HOURS PRN
Status: DISCONTINUED | OUTPATIENT
Start: 2024-07-12 | End: 2024-07-12 | Stop reason: HOSPADM

## 2024-07-12 RX ORDER — DIGOXIN 125 MCG
125 TABLET ORAL DAILY
Status: DISCONTINUED | OUTPATIENT
Start: 2024-07-13 | End: 2024-07-12 | Stop reason: HOSPADM

## 2024-07-12 RX ORDER — ACETAMINOPHEN 650 MG/1
650 SUPPOSITORY RECTAL EVERY 6 HOURS PRN
Status: DISCONTINUED | OUTPATIENT
Start: 2024-07-12 | End: 2024-07-12 | Stop reason: HOSPADM

## 2024-07-12 RX ORDER — ACETAMINOPHEN 160 MG/5ML
650 SOLUTION ORAL EVERY 6 HOURS PRN
Status: DISCONTINUED | OUTPATIENT
Start: 2024-07-12 | End: 2024-07-12 | Stop reason: HOSPADM

## 2024-07-12 RX ORDER — PROTAMINE SULFATE 10 MG/ML
INJECTION, SOLUTION INTRAVENOUS CONTINUOUS PRN
Status: COMPLETED | OUTPATIENT
Start: 2024-07-12 | End: 2024-07-12

## 2024-07-12 RX ORDER — DOCUSATE SODIUM 100 MG/1
100 CAPSULE, LIQUID FILLED ORAL 2 TIMES DAILY
Status: DISCONTINUED | OUTPATIENT
Start: 2024-07-12 | End: 2024-07-12 | Stop reason: HOSPADM

## 2024-07-12 RX ORDER — CEFAZOLIN 1 G/1
INJECTION, POWDER, FOR SOLUTION INTRAVENOUS AS NEEDED
Status: DISCONTINUED | OUTPATIENT
Start: 2024-07-12 | End: 2024-07-12 | Stop reason: HOSPADM

## 2024-07-12 RX ORDER — PANTOPRAZOLE SODIUM 40 MG/1
40 TABLET, DELAYED RELEASE ORAL
Status: DISCONTINUED | OUTPATIENT
Start: 2024-07-13 | End: 2024-07-12 | Stop reason: HOSPADM

## 2024-07-12 RX ORDER — CLOPIDOGREL BISULFATE 300 MG/1
600 TABLET, FILM COATED ORAL ONCE
Status: DISCONTINUED | OUTPATIENT
Start: 2024-07-12 | End: 2024-07-12 | Stop reason: HOSPADM

## 2024-07-12 RX ORDER — PROCHLORPERAZINE 25 MG/1
25 SUPPOSITORY RECTAL EVERY 12 HOURS PRN
Status: DISCONTINUED | OUTPATIENT
Start: 2024-07-12 | End: 2024-07-12 | Stop reason: HOSPADM

## 2024-07-12 RX ORDER — PANTOPRAZOLE SODIUM 40 MG/10ML
40 INJECTION, POWDER, LYOPHILIZED, FOR SOLUTION INTRAVENOUS
Status: DISCONTINUED | OUTPATIENT
Start: 2024-07-13 | End: 2024-07-12 | Stop reason: HOSPADM

## 2024-07-12 RX ORDER — SOTALOL HYDROCHLORIDE 120 MG/1
120 TABLET ORAL EVERY 12 HOURS
Status: DISCONTINUED | OUTPATIENT
Start: 2024-07-13 | End: 2024-07-12 | Stop reason: HOSPADM

## 2024-07-12 RX ORDER — FENTANYL CITRATE 50 UG/ML
INJECTION, SOLUTION INTRAMUSCULAR; INTRAVENOUS AS NEEDED
Status: DISCONTINUED | OUTPATIENT
Start: 2024-07-12 | End: 2024-07-12 | Stop reason: HOSPADM

## 2024-07-12 ASSESSMENT — COLUMBIA-SUICIDE SEVERITY RATING SCALE - C-SSRS
6. HAVE YOU EVER DONE ANYTHING, STARTED TO DO ANYTHING, OR PREPARED TO DO ANYTHING TO END YOUR LIFE?: NO
1. IN THE PAST MONTH, HAVE YOU WISHED YOU WERE DEAD OR WISHED YOU COULD GO TO SLEEP AND NOT WAKE UP?: NO
2. HAVE YOU ACTUALLY HAD ANY THOUGHTS OF KILLING YOURSELF?: NO

## 2024-07-12 NOTE — PRE-SEDATION DOCUMENTATION
Patient: Monse Dillon  MRN: [id number]      Physical Exam    Airway  Mallampati: IV     Cardiovascular    Dental    Pulmonary        Plan    ASA 3

## 2024-07-12 NOTE — H&P
History Of Present Illness  Monse Dillon is a 68 y.o. female presenting with  with hypertension and persitent atrial fibrillation diagnosed a year ago.  Patient underwent DC cardioversion in November 2023 and was initially anticoagulated with Xarelto and managed with a rhythm control strategy with sotalol.. The patient has normal LVEF with left ventricular ejection fraction of 55 to 60% based on transthoracic echocardiogram January 2024.  She has no history of significant valvular heart disease..     Patient has no prior history of TIA or CVA.     Unfortunately the patient's course has been notable for symptomatic anemia necessitating hospitalization in January 2024.  She underwent EGD and colonoscopy which was notable for internal hemorrhoids.  She underwent capsule endoscopy in May which demonstrated small bowell AVM and was treated by hemostasis.  She was managed with iron infusions but did not require packed red blood cell transfusion.  At the time of her admission for symptomatic anemia she was taken off of anticoagulant therapy. She is currently on aspirin and plavix.     Given the patient's significant severe symptomatic anemia,  the patient is admitted for left atrial appendage closure for stroke risk reduction.         ROS:  Constitutional: fatigue with anemia.  Feeling better now.  Eyes: no acute eye problems, no blurred vision, no diplopia, no eye pain  ENT:  no acute hearing loss, no earache, no sore throat  Cardiovascular: no dyspnea on exertion, no chest pain  Respiratory: no chronic cough, not coughing up sputum, no wheezing that is consistent with asthma  Gastrointestinal: no acute bowel complaints  Musculoskeletal: no acute arthralgias, no acute myalgias, no acute joint swelling  Skin: no skin rashes, no change in skin color and pigmentation, no skin lesions and no skin lumps.   Neurological: no headaches, no dizziness, no tingling, no fainting and no limb weakness.   Psychiatric:  no suicidal  ideation, no confusion, no personality change and no emotional problems.   Hematologic/Lymphatic: As noted above, other then mentioned in HPI  All other systems have been reviewed and are negative for complaint.      Physical Exam:     Constitutional: alert and in no acute distress.   Eyes: no erythema, swelling or discharge from the eye .   Ears, Nose, Mouth, and Throat: external inspection of ears and nose is normal , lips, teeth, and gums are normal with good dentition  and oropharynx normal with no erythema, edema, exudate or lesions .   Neck: neck is supple, symmetric, trachea midline, no masses  and no thyromegaly .   Pulmonary: no increased work of breathing or signs of respiratory distress , lungs clear to auscultation. , normal percussion of chest  and chest palpation normal .   Cardiovascular: irregular Ryhtm, no murmur,  no leg edema, non-displaced PMI, no S3 or S4  Abdomen: abdomen non-tender, no masses  and no hepatomegaly .           Skin:  no skin lesions          Neurologic: non-focal neurologic examination.      Psychiatric judgment and insight is normal , oriented to person, place and time , normal mood and affect .         Past Medical History  No past medical history on file.    Surgical History  Past Surgical History:   Procedure Laterality Date    COLONOSCOPY      COLONOSCOPY  10/2023        Social History  She reports that she has quit smoking. Her smoking use included cigarettes. She has never used smokeless tobacco. She reports current alcohol use. She reports that she does not use drugs.    Family History  Family History   Problem Relation Name Age of Onset    Other (Malignant Neoplasm of breast) Mother      Coronary artery disease Father          Allergies  Patient has no known allergies.       Plan:  LAAC   Aspirin loading  Plavix load post procedure  Bed rest, neuro/peripheral/groin checks per protocol  Limited ECHO post procedure  DAPT at discharge (Plavix x6 months, ASA 81mg for  life)  Follow-up with primary cards/PCP in 1-2 weeks  4 month Watchman CTA to reassess device  Pt to be added to registry  Likely discharge home today once recovery and monitoring period is complete.       Jay Franks MD

## 2024-07-12 NOTE — PROGRESS NOTES
Pharmacy Medication History Review    Monse Dillon is a 68 y.o. female admitted for Atrial fibrillation (Multi). Pharmacy reviewed the patient's [id number] medications and allergies for accuracy.    Medications ADDED:  N/A  Medications CHANGED:  N/A  Medications REMOVED:   N/A     The list below reflects the updated PTA list. Comments regarding how patient may be taking medications differently can be found in the Admit Orders Activity  Prior to Admission Medications   Prescriptions Last Dose Informant   aspirin 81 mg EC tablet 7/11/2024 at pm Self   Sig: Take 1 tablet (81 mg) by mouth once daily.   clopidogrel (Plavix) 75 mg tablet 7/11/2024 at pm Self   Sig: Take 1 tablet (75 mg) by mouth once daily.   digoxin (Lanoxin) 125 MCG tablet 7/11/2024 at am Self   Sig: Take 1 tablet (125 mcg) by mouth once daily.   sotalol (Betapace) 120 mg tablet 7/11/2024 at pm Self   Sig: Take 1 tablet (120 mg) by mouth every 12 hours.      Facility-Administered Medications: None       The list below reflects the updated allergy list. Please review each documented allergy for additional clarification and justification.  Allergies  Reviewed by Melonie Nixon RN on 7/5/2024   No Known Allergies         M2B service not offered prior to surgery, please reassess prior to patient discharge if Meds to Beds is desired.  Pharmacy has been updated to [facility] in [place].    Sources used to complete the med history include out patient fill history, OARRS, and patient interview   Reliable historian- detailed medication list at bedside for review    Below are additional concerns with the patient's PTA list.  N/A    Atul Sorensen PharmD  Transitions of Care Pharmacist   Meds Ambulatory and Retail Services  Please reach out via Secure Chat for questions, or if no response call [employer] or [facility]

## 2024-07-12 NOTE — DISCHARGE SUMMARY
STRUCT[facility] INPATIENT DISCHARGE SUMMARY    BRIEF OVERVIEW  Admitting Provider: Torrey Busch MD  Discharge Provider: Torrey Busch MD  Primary Care Physician at Discharge: Maria Luz Jennifer Kapthiago, APRN--[phone]     Admission Date: 7/12/2024     Discharge Date: 7/12/2024    Primary Discharge Diagnosis  Atrial fibrillation (Multi)    Discharge Disposition  Home    Active Issues Requiring Follow-up  Normal Watchman follow-up    Outpatient Follow-Up  Future Appointments   Date Time Provider Department Center   8/12/2024 10:40 AM Jamel Cuevas MD [id number] [place]   11/12/2024 10:15 AM ELY [id number] CT 1 [id number] [facility]   12/3/2024  2:50 PM Jamel Cuevas MD [id number] [place]       Results for orders placed or performed during the hospital encounter of 07/12/24 (from the past 96 hour(s))   ECG 12 lead   Result Value Ref Range    Ventricular Rate 88 BPM    Atrial Rate 300 BPM    QRS Duration 112 ms    QT Interval 414 ms    QTC Calculation(Bazett) 500 ms    R Axis 78 degrees    T Axis -53 degrees    QRS Count 15 beats    Q Onset 225 ms    T Offset 432 ms    QTC Fredericia 470 ms   Transthoracic Echo (TTE) Limited   Result Value Ref Range    LV EF 58 %   ACTIVATED CLOTTING TIME LOW   Result Value Ref Range    POCT Activated Clotting Time Low Range 345 (H) 83 - 199 sec       Test Results Pending at Discharge  Pending Labs       No current pending labs.                 Your medication list        START taking these medications        Instructions Last Dose Given Next Dose Due   acetaminophen 325 mg tablet  Commonly known as: Tylenol      Take 2 tablets (650 mg) by mouth every 6 hours if needed for mild pain (1 - 3), moderate pain (4 - 6) or headaches.              CONTINUE taking these medications        Instructions Last Dose Given Next Dose Due   aspirin 81 mg EC tablet           clopidogrel 75 mg tablet  Commonly known as: Plavix      Take 1 tablet (75 mg) by mouth once daily.       digoxin 125  MCG tablet  Commonly known as: Lanoxin           sotalol 120 mg tablet  Commonly known as: Betapace                     Where to Get Your Medications        You can get these medications from any pharmacy    You don't need a prescription for these medications  acetaminophen 325 mg tablet            DETAILS OF HOSPITAL STAY    Presenting Problem  Patient Active Problem List    Diagnosis Date Noted    Atrial fibrillation, unspecified type (Multi) 07/08/2024    Former smoker 02/28/2024    Recurrent gastrointestinal hemorrhage 02/28/2024    Anticoagulated 10/31/2023    BMI less than 19,adult 10/30/2023    Essential hypertension, benign 10/30/2023    Persistent atrial fibrillation (Multi) 10/30/2023    Atrial fibrillation (Multi) 06/07/2024        LOS: 0 days     Objective     Vital signs in last 24 hours:  Heart Rate:  [52-77] 52  Resp:  [17-19] 17  BP: (148-174)/() 148/84    Physical Exam at Discharge  Discharge Condition: good  Heart Rate: 52  Resp: 17  BP: 148/84  Weight:      Constitutional: Well developed, awake/alert/oriented x3, no distress, cooperative  Eyes: PERRL, EOMI, clear sclera  ENMT: mucous membranes moist  Head/Neck: Neck supple, No JVD  Respiratory/Thorax: Good chest expansion, thorax symmetric, lung sounds clear but with diminished bases  Cardiovascular: Regular rate and rhythm, no murmurs, normal S1 and S2  Gastrointestinal: Nondistended, soft, non-tender, no rebound tenderness or guarding, no masses palpable, no organomegaly, +BS  Extremities: trace BLE edema, no cyanosis, bilat groins c/d/i with dsd no s/s of hematoma  Neurological: alert and oriented x3, intact senses, motor, response and reflexes, normal strength  Psychological: Appropriate mood and behavior   Skin: Warm and dry, intact.       History of Present Illness  Monse Dillon is a 68 y.o. female with a PMH of hypertension and persitent atrial fibrillation .  Pt presented on 7/12/24 for elective [facility]  Course     Monse Dillon is now s/p LAAO - Watchman  via Right Femoral Vein on 7/12/24 with Dr. Busch.     Post-operative Follow-    Plan:  - D/C home today 7/12/2024  - Cont ASA (for life)  - Plavix for 6 months.  - CTA/HELEN at 4 months is required to reassess device positioning and rule out any device leak or device related thrombus.  - follow up with Structural Heart clinic in one week, 1 month, and 1 year  - f/w Dr. Cuevas (Primary Cards) as scheduled or in 6-10 weeks  - f/w VIRI Ontiveros-CNP in 1-2 weeks  - pt given Lab recs (1 week) and ECHO rec (1 month)     Jay Franks MD

## 2024-07-23 ENCOUNTER — TELEPHONE (OUTPATIENT)
Dept: CARDIOLOGY | Facility: HOSPITAL | Age: 69
End: 2024-07-23
Payer: MEDICARE

## 2024-07-23 NOTE — TELEPHONE ENCOUNTER
RN coordinator received call from patient  regarding her incisions post  WATCHMAN  implant. No answer left message to call back

## 2024-08-12 ENCOUNTER — APPOINTMENT (OUTPATIENT)
Dept: CARDIOLOGY | Facility: CLINIC | Age: 69
End: 2024-08-12
Payer: MEDICARE

## 2024-08-12 VITALS
HEART RATE: 72 BPM | WEIGHT: 94 LBS | DIASTOLIC BLOOD PRESSURE: 86 MMHG | BODY MASS INDEX: 16.05 KG/M2 | SYSTOLIC BLOOD PRESSURE: 126 MMHG | HEIGHT: 64 IN

## 2024-08-12 DIAGNOSIS — I10 ESSENTIAL HYPERTENSION, BENIGN: ICD-10-CM

## 2024-08-12 DIAGNOSIS — Z87.891 FORMER SMOKER: ICD-10-CM

## 2024-08-12 DIAGNOSIS — I48.19 PERSISTENT ATRIAL FIBRILLATION (MULTI): Primary | ICD-10-CM

## 2024-08-12 DIAGNOSIS — Z95.818 PRESENCE OF WATCHMAN LEFT ATRIAL APPENDAGE CLOSURE DEVICE: ICD-10-CM

## 2024-08-12 DIAGNOSIS — K92.2 RECURRENT GASTROINTESTINAL HEMORRHAGE: ICD-10-CM

## 2024-08-12 PROBLEM — I48.91 ATRIAL FIBRILLATION (MULTI): Status: RESOLVED | Noted: 2024-06-07 | Resolved: 2024-08-12

## 2024-08-12 PROBLEM — Z79.01 ANTICOAGULATED: Status: RESOLVED | Noted: 2023-10-31 | Resolved: 2024-08-12

## 2024-08-12 PROBLEM — I48.91 ATRIAL FIBRILLATION, UNSPECIFIED TYPE (MULTI): Status: RESOLVED | Noted: 2024-07-08 | Resolved: 2024-08-12

## 2024-08-12 PROCEDURE — 99214 OFFICE O/P EST MOD 30 MIN: CPT | Performed by: INTERNAL MEDICINE

## 2024-08-12 PROCEDURE — 3079F DIAST BP 80-89 MM HG: CPT | Performed by: INTERNAL MEDICINE

## 2024-08-12 PROCEDURE — 1160F RVW MEDS BY RX/DR IN RCRD: CPT | Performed by: INTERNAL MEDICINE

## 2024-08-12 PROCEDURE — 1036F TOBACCO NON-USER: CPT | Performed by: INTERNAL MEDICINE

## 2024-08-12 PROCEDURE — 93000 ELECTROCARDIOGRAM COMPLETE: CPT | Performed by: INTERNAL MEDICINE

## 2024-08-12 PROCEDURE — 1159F MED LIST DOCD IN RCRD: CPT | Performed by: INTERNAL MEDICINE

## 2024-08-12 PROCEDURE — 3074F SYST BP LT 130 MM HG: CPT | Performed by: INTERNAL MEDICINE

## 2024-08-12 PROCEDURE — 3008F BODY MASS INDEX DOCD: CPT | Performed by: INTERNAL MEDICINE

## 2024-08-12 RX ORDER — METOPROLOL SUCCINATE 100 MG/1
100 TABLET, EXTENDED RELEASE ORAL DAILY
Qty: 90 TABLET | Refills: 3 | Status: SHIPPED | OUTPATIENT
Start: 2024-08-12 | End: 2025-08-12

## 2024-08-12 NOTE — PATIENT INSTRUCTIONS
Please bring all medicines, vitamins, and herbal supplements with you when you come to the office.    Prescriptions will not be filled unless you are compliant with your follow up appointments or have a follow up appointment scheduled as per instruction of your physician. Refills should be requested at the time of your visit.     BMI was below normal measurement. Current weight: (!) 42.6 kg (94 lb)  Weight change since last visit (-) denotes wt loss -4.8 lbs   Weight gain needed to achieve  BMI of 18.5 = 13.5 Lbs    Instructed patient to consume high calorie, high protein diet.

## 2024-08-12 NOTE — PROGRESS NOTES
"Subjective   Monse Dillon is a 68 y.o. female       Chief Complaint    Follow-up          HPI        Patient is here for follow-up continue management for persistent atrial fibrillation, intolerance to anticoagulation with recent placement of Watchman device.  Since last time I saw her she reports she underwent Watchman device with any cardiac issues or complication.  There is a result of her workup all noted and reviewed with her.  She feels well.  She is tolerating aspirin and Plavix.  Her heart rate is controlled.  She was left on sotalol total after the Watchman device with plan to switch to metoprolol.    Assessment     1. Persistent atrial fibrillation.  Heart rate controlled.  Unable to tolerate anticoagulation due to recurrent GI bleed  2.  Intolerance to anticoagulation due to GI bleed  3. CHADS2 vascular score of 3   4 hypertension controlled  5.  Status post Watchman device she is on aspirin and Plavix     Plan     1. I advised the patient to switch sotalol to me to metoprolol  mg daily.  She will have an EKG done in 2 to 3 weeks to ensure her heart rate remains controlled  2.  I reviewed with the patient the instruction for aspirin and Plavix and the need to stay on dual antiplatelet therapy for 6 months and to keep her appointment for her CT scan and I provided her with prescription for BMP to do it before her CT scan  3.   I reviewed her recent hospitalization  5.  I will see her back in 6 months           Vitals:    08/12/24 1049   BP: 126/86   BP Location: Right arm   Patient Position: Sitting   Pulse: 72   Weight: (!) 42.6 kg (94 lb)   Height: 1.626 m (5' 4\")     EKG done in office today        Objective   Physical Exam  Constitutional:       Appearance: Normal appearance.   HENT:      Nose: Nose normal.   Neck:      Vascular: No carotid bruit.   Cardiovascular:      Rate and Rhythm: Normal rate. Rhythm irregularly irregular.      Pulses: Normal pulses.      Heart sounds: Normal heart " sounds.   Pulmonary:      Effort: Pulmonary effort is normal.   Abdominal:      General: Bowel sounds are normal.      Palpations: Abdomen is soft.   Musculoskeletal:         General: Normal range of motion.      Cervical back: Normal range of motion.      Right lower leg: No edema.      Left lower leg: No edema.   Skin:     General: Skin is warm and dry.   Neurological:      General: No focal deficit present.      Mental Status: She is alert.   Psychiatric:         Mood and Affect: Mood normal.         Behavior: Behavior normal.         Thought Content: Thought content normal.         Judgment: Judgment normal.         Allergies  Patient has no known allergies.     Current Medications    Current Outpatient Medications:     acetaminophen (Tylenol) 325 mg tablet, Take 2 tablets (650 mg) by mouth every 6 hours if needed for mild pain (1 - 3), moderate pain (4 - 6) or headaches., Disp: , Rfl:     aspirin 81 mg EC tablet, Take 1 tablet (81 mg) by mouth once daily., Disp: , Rfl:     clopidogrel (Plavix) 75 mg tablet, Take 1 tablet (75 mg) by mouth once daily., Disp: 30 tablet, Rfl: 11    digoxin (Lanoxin) 125 MCG tablet, Take 1 tablet (125 mcg) by mouth once daily., Disp: , Rfl:     metoprolol succinate XL (Toprol XL) 100 mg 24 hr tablet, Take 1 tablet (100 mg) by mouth once daily. Do not crush or chew., Disp: 90 tablet, Rfl: 3                     Assessment/Plan   1. Persistent atrial fibrillation (Multi)  Follow Up In Cardiology    ECG 12 Lead    metoprolol succinate XL (Toprol XL) 100 mg 24 hr tablet    ECG 12 Lead      2. Essential hypertension, benign  Basic Metabolic Panel    metoprolol succinate XL (Toprol XL) 100 mg 24 hr tablet      3. Recurrent gastrointestinal hemorrhage        4. Presence of Watchman left atrial appendage closure device        5. Former smoker        6. BMI less than 19,adult                 Scribe Attestation  By signing my name below, Yvonne GOODWIN LPN  , Scribe   attest that this  documentation has been prepared under the direction and in the presence of Jamel Cuevas MD.     Provider Attestation - Scribe documentation    All medical record entries made by the Scribe were at my direction and personally dictated by me. I have reviewed the chart and agree that the record accurately reflects my personal performance of the history, physical exam, discussion and plan.

## 2024-08-12 NOTE — LETTER
August 12, 2024     Maria Luz Polanco, APRN-CNP  [address]  [facility]  [facility] [address]    Patient: Monse Dillon   YOB: 1955   Date of Visit: 8/12/2024       Dear Dr. Maria Luz Polanco, APRN-CNP:    Thank you for referring Monse Dillon to me for evaluation. Below are my notes for this consultation.  If you have questions, please do not hesitate to call me. I look forward to following your patient along with you.       Sincerely,     Jamel Cuevas MD      CC: No Recipients  ______________________________________________________________________________________    Subjective   Monse Dillon is a 68 y.o. female       Chief Complaint    Follow-up          HPI        Patient is here for follow-up continue management for persistent atrial fibrillation, intolerance to anticoagulation with recent placement of Watchman device.  Since last time I saw her she reports she underwent Watchman device with any cardiac issues or complication.  There is a result of her workup all noted and reviewed with her.  She feels well.  She is tolerating aspirin and Plavix.  Her heart rate is controlled.  She was left on sotalol total after the Watchman device with plan to switch to metoprolol.    Assessment     1. Persistent atrial fibrillation.  Heart rate controlled.  Unable to tolerate anticoagulation due to recurrent GI bleed  2.  Intolerance to anticoagulation due to GI bleed  3. CHADS2 vascular score of 3   4 hypertension controlled  5.  Status post Watchman device she is on aspirin and Plavix     Plan     1. I advised the patient to switch sotalol to me to metoprolol  mg daily.  She will have an EKG done in 2 to 3 weeks to ensure her heart rate remains controlled  2.  I reviewed with the patient the instruction for aspirin and Plavix and the need to stay on dual antiplatelet therapy for 6 months and to keep her appointment for her CT scan and I provided her with prescription  "for BMP to do it before her CT scan  3.   I reviewed her recent hospitalization  5.  I will see her back in 6 months           Vitals:    08/12/24 1049   BP: 126/86   BP Location: Right arm   Patient Position: Sitting   Pulse: 72   Weight: (!) 42.6 kg (94 lb)   Height: 1.626 m (5' 4\")     EKG done in office today        Objective   Physical Exam  Constitutional:       Appearance: Normal appearance.   HENT:      Nose: Nose normal.   Neck:      Vascular: No carotid bruit.   Cardiovascular:      Rate and Rhythm: Normal rate. Rhythm irregularly irregular.      Pulses: Normal pulses.      Heart sounds: Normal heart sounds.   Pulmonary:      Effort: Pulmonary effort is normal.   Abdominal:      General: Bowel sounds are normal.      Palpations: Abdomen is soft.   Musculoskeletal:         General: Normal range of motion.      Cervical back: Normal range of motion.      Right lower leg: No edema.      Left lower leg: No edema.   Skin:     General: Skin is warm and dry.   Neurological:      General: No focal deficit present.      Mental Status: She is alert.   Psychiatric:         Mood and Affect: Mood normal.         Behavior: Behavior normal.         Thought Content: Thought content normal.         Judgment: Judgment normal.         Allergies  Patient has no known allergies.     Current Medications    Current Outpatient Medications:   •  acetaminophen (Tylenol) 325 mg tablet, Take 2 tablets (650 mg) by mouth every 6 hours if needed for mild pain (1 - 3), moderate pain (4 - 6) or headaches., Disp: , Rfl:   •  aspirin 81 mg EC tablet, Take 1 tablet (81 mg) by mouth once daily., Disp: , Rfl:   •  clopidogrel (Plavix) 75 mg tablet, Take 1 tablet (75 mg) by mouth once daily., Disp: 30 tablet, Rfl: 11  •  digoxin (Lanoxin) 125 MCG tablet, Take 1 tablet (125 mcg) by mouth once daily., Disp: , Rfl:   •  metoprolol succinate XL (Toprol XL) 100 mg 24 hr tablet, Take 1 tablet (100 mg) by mouth once daily. Do not crush or chew., Disp: " 90 tablet, Rfl: 3                     Assessment/Plan   1. Persistent atrial fibrillation (Multi)  Follow Up In Cardiology    ECG 12 Lead    metoprolol succinate XL (Toprol XL) 100 mg 24 hr tablet    ECG 12 Lead      2. Essential hypertension, benign  Basic Metabolic Panel    metoprolol succinate XL (Toprol XL) 100 mg 24 hr tablet      3. Recurrent gastrointestinal hemorrhage        4. Presence of Watchman left atrial appendage closure device        5. Former smoker        6. BMI less than 19,adult                 Scribe Attestation  By signing my name below, I, Yvonne GOEL LPN  , Scribe   attest that this documentation has been prepared under the direction and in the presence of Jamel Cuevas MD.     Provider Attestation - Scribe documentation    All medical record entries made by the Scribe were at my direction and personally dictated by me. I have reviewed the chart and agree that the record accurately reflects my personal performance of the history, physical exam, discussion and plan.

## 2024-08-27 ENCOUNTER — APPOINTMENT (OUTPATIENT)
Dept: CARDIOLOGY | Facility: CLINIC | Age: 69
End: 2024-08-27
Payer: MEDICARE

## 2024-08-27 VITALS
WEIGHT: 96.4 LBS | BODY MASS INDEX: 16.46 KG/M2 | HEART RATE: 58 BPM | DIASTOLIC BLOOD PRESSURE: 80 MMHG | HEIGHT: 64 IN | SYSTOLIC BLOOD PRESSURE: 108 MMHG

## 2024-08-27 DIAGNOSIS — I48.19 PERSISTENT ATRIAL FIBRILLATION (MULTI): ICD-10-CM

## 2024-08-27 PROCEDURE — 93000 ELECTROCARDIOGRAM COMPLETE: CPT | Performed by: INTERNAL MEDICINE

## 2024-08-27 NOTE — PROGRESS NOTES
"Patient is here for an EKG visit ordered by Dr. Cuevas due to atrial fibrillation. Dr. Cuevas is in suite to review EKG prior to discharge. Patient is here due to medication change. At last office visit on 8/12/24 Dr. Jamel Cuevas MD discontinued Sotalol and initiated Metoprolol ER 100mg once daily. Medication list Updated verbally. With patient in office. Denies any cardiac complaints at this time.     To Dr. Cuevas to read.         Vitals:    08/27/24 1400   BP: 108/80   BP Location: Left arm   Patient Position: Sitting   Pulse: 58   Weight: (!) 43.7 kg (96 lb 6.4 oz)   Height: 1.626 m (5' 4\")       "

## 2024-11-04 LAB
NON-UH HIE ANION GAP: 10.9 (ref 6–15)
NON-UH HIE BLOOD UREA NITROGEN: 18 MG/DL (ref 7–25)
NON-UH HIE CALCIUM: 9.5 MG/DL (ref 8.6–10.3)
NON-UH HIE CARBON DIOXIDE: 29.9 MMOL/L (ref 21–31)
NON-UH HIE CHLORIDE: 105 MMOL/L (ref 98–107)
NON-UH HIE CREATININE: 1 MG/DL (ref 0.6–1.2)
NON-UH HIE ESTIMATED GFR: > 60
NON-UH HIE GLUCOSE: 85 MG/DL (ref 70–100)
NON-UH HIE POTASSIUM: 4.8 MMOL/L (ref 3.5–5.1)
NON-UH HIE SODIUM: 141 MMOL/L (ref 136–145)

## 2024-11-12 ENCOUNTER — HOSPITAL ENCOUNTER (OUTPATIENT)
Dept: RADIOLOGY | Facility: CLINIC | Age: 69
Discharge: HOME | End: 2024-11-12
Payer: MEDICARE

## 2024-11-12 DIAGNOSIS — I48.91 ATRIAL FIBRILLATION, UNSPECIFIED TYPE (MULTI): ICD-10-CM

## 2024-11-12 PROCEDURE — 75572 CT HRT W/3D IMAGE: CPT

## 2024-11-12 PROCEDURE — 2550000001 HC RX 255 CONTRASTS: Performed by: INTERNAL MEDICINE

## 2024-12-03 ENCOUNTER — APPOINTMENT (OUTPATIENT)
Dept: CARDIOLOGY | Facility: CLINIC | Age: 69
End: 2024-12-03
Payer: MEDICARE

## 2025-02-12 ENCOUNTER — APPOINTMENT (OUTPATIENT)
Dept: CARDIOLOGY | Facility: CLINIC | Age: 70
End: 2025-02-12
Payer: MEDICARE

## 2025-02-12 VITALS
BODY MASS INDEX: 16.73 KG/M2 | SYSTOLIC BLOOD PRESSURE: 121 MMHG | HEART RATE: 64 BPM | WEIGHT: 98 LBS | HEIGHT: 64 IN | DIASTOLIC BLOOD PRESSURE: 82 MMHG

## 2025-02-12 DIAGNOSIS — Z95.818 PRESENCE OF WATCHMAN LEFT ATRIAL APPENDAGE CLOSURE DEVICE: ICD-10-CM

## 2025-02-12 DIAGNOSIS — I48.19 PERSISTENT ATRIAL FIBRILLATION (MULTI): Primary | ICD-10-CM

## 2025-02-12 DIAGNOSIS — Z87.891 FORMER SMOKER: ICD-10-CM

## 2025-02-12 DIAGNOSIS — I10 ESSENTIAL HYPERTENSION, BENIGN: ICD-10-CM

## 2025-02-12 DIAGNOSIS — K92.2 RECURRENT GASTROINTESTINAL HEMORRHAGE: ICD-10-CM

## 2025-02-12 PROCEDURE — 1036F TOBACCO NON-USER: CPT | Performed by: INTERNAL MEDICINE

## 2025-02-12 PROCEDURE — 3074F SYST BP LT 130 MM HG: CPT | Performed by: INTERNAL MEDICINE

## 2025-02-12 PROCEDURE — 99214 OFFICE O/P EST MOD 30 MIN: CPT | Performed by: INTERNAL MEDICINE

## 2025-02-12 PROCEDURE — 3079F DIAST BP 80-89 MM HG: CPT | Performed by: INTERNAL MEDICINE

## 2025-02-12 PROCEDURE — 1159F MED LIST DOCD IN RCRD: CPT | Performed by: INTERNAL MEDICINE

## 2025-02-12 PROCEDURE — 3008F BODY MASS INDEX DOCD: CPT | Performed by: INTERNAL MEDICINE

## 2025-02-12 RX ORDER — DIGOXIN 125 MCG
125 TABLET ORAL DAILY
Qty: 90 TABLET | Refills: 3 | Status: SHIPPED | OUTPATIENT
Start: 2025-02-12 | End: 2026-02-12

## 2025-02-12 RX ORDER — METOPROLOL SUCCINATE 100 MG/1
100 TABLET, EXTENDED RELEASE ORAL DAILY
Qty: 90 TABLET | Refills: 3 | Status: SHIPPED | OUTPATIENT
Start: 2025-02-12 | End: 2026-02-12

## 2025-02-12 ASSESSMENT — ENCOUNTER SYMPTOMS: PALPITATIONS: 1

## 2025-02-12 NOTE — LETTER
February 12, 2025     Maria Luz Polanco, APRN-CNP  [address]  [facility]  [facility] [address]    Patient: Monse Dillon   YOB: 1955   Date of Visit: 2/12/2025       Dear Dr. Maria Luz Polanco, APRN-CNP:    Thank you for referring Monse Dillon to me for evaluation. Below are my notes for this consultation.  If you have questions, please do not hesitate to call me. I look forward to following your patient along with you.       Sincerely,     Jamel Cuevas MD      CC: No Recipients  ______________________________________________________________________________________    Subjective   Monse Dillon is a 69 y.o. female       Chief Complaint    Follow-up          HPI        Patient is here for follow-up to management for persistent atrial fibrillation with documented history of intolerance to anticoagulation she is status post Watchman device, hypertension.  Since last time I saw her she has done well she denies any cardiac complaints chest pain, palpitation, lightheadedness, dizziness or syncope.  She remains active.  She   finished 6 months of dual antiplatelet therapy.  Assessment     1. Persistent atrial fibrillation.  Heart rate controlled.  Unable to tolerate anticoagulation due to recurrent GI bleed  2.  Intolerance to anticoagulation due to GI bleed  3. CHADS2 vascular score of 3   4 hypertension controlled  5.  Status post Watchman device she is on aspirin and Plavix for almost 7-month  6.  Abnormality noted on the lung field but appears to be stable.  I gave the patient copy of her CT scan with advised to repeat her CT scan through her PCP     Plan     1. I advised the patient to stop Plavix and continue aspirin  2.  I reviewed the result of her CT scan and the abnormality that was noted on her lung and I advised her to review with PCP and repeat CT scan I did have a copy of her CT scan  3.   I reviewed her recent hospitalization we will see her back in the  "office in 6 months we will plan to do an EKG    Review of Systems   Cardiovascular:  Positive for palpitations.   All other systems reviewed and are negative.           Vitals:    02/12/25 1321 02/12/25 1338   BP: (!) 138/100 121/82   BP Location: Left arm Left arm   Patient Position: Sitting Sitting   Pulse: 64    Weight: (!) 44.5 kg (98 lb)    Height: 1.626 m (5' 4\")         Objective   Physical Exam  Constitutional:       Appearance: Normal appearance.   HENT:      Nose: Nose normal.   Neck:      Vascular: No carotid bruit.   Cardiovascular:      Rate and Rhythm: Normal rate. Rhythm irregularly irregular.      Pulses: Normal pulses.      Heart sounds: Normal heart sounds.   Pulmonary:      Effort: Pulmonary effort is normal.   Abdominal:      General: Bowel sounds are normal.      Palpations: Abdomen is soft.   Musculoskeletal:         General: Normal range of motion.      Cervical back: Normal range of motion.      Right lower leg: No edema.      Left lower leg: No edema.   Skin:     General: Skin is warm and dry.   Neurological:      General: No focal deficit present.      Mental Status: She is alert.   Psychiatric:         Mood and Affect: Mood normal.         Behavior: Behavior normal.         Thought Content: Thought content normal.         Judgment: Judgment normal.         Allergies  Patient has no known allergies.     Current Medications    Current Outpatient Medications:   •  aspirin 81 mg EC tablet, Take 1 tablet (81 mg) by mouth once daily., Disp: , Rfl:   •  digoxin (Lanoxin) 125 MCG tablet, Take 1 tablet (125 mcg) by mouth once daily., Disp: 90 tablet, Rfl: 3  •  metoprolol succinate XL (Toprol XL) 100 mg 24 hr tablet, Take 1 tablet (100 mg) by mouth once daily. Do not crush or chew., Disp: 90 tablet, Rfl: 3                     Assessment/Plan   1. Persistent atrial fibrillation (Multi)  Follow Up In Cardiology    metoprolol succinate XL (Toprol XL) 100 mg 24 hr tablet    digoxin (Lanoxin) 125 MCG " tablet      2. Presence of Watchman left atrial appendage closure device  Follow Up In Cardiology      3. Essential hypertension, benign  metoprolol succinate XL (Toprol XL) 100 mg 24 hr tablet      4. Recurrent gastrointestinal hemorrhage        5. BMI less than 19,adult        6. Former smoker                 Scribe Attestation  By signing my name below, I, Pamela Kaur LPNibe   attest that this documentation has been prepared under the direction and in the presence of Jamel Cuevas MD.     Provider Attestation - Scribe documentation    All medical record entries made by the Scribe were at my direction and personally dictated by me. I have reviewed the chart and agree that the record accurately reflects my personal performance of the history, physical exam, discussion and plan.

## 2025-02-12 NOTE — PATIENT INSTRUCTIONS
Please bring all medicines, vitamins, and herbal supplements with you when you come to the office.    Prescriptions will not be filled unless you are compliant with your follow up appointments or have a follow up appointment scheduled as per instruction of your physician. Refills should be requested at the time of your visit.     BMI was below normal measurement. Current weight: (!) 44.5 kg (98 lb)  Weight change since last visit (-) denotes wt loss 1.6 lbs   Weight gain needed to achieve  BMI of 18.5 = 9.5 Lbs    Provided dietary education for weight gain to the patient.

## 2025-02-12 NOTE — PROGRESS NOTES
"Subjective   Monse Dillon is a 69 y.o. female       Chief Complaint    Follow-up          HPI        Patient is here for follow-up to management for persistent atrial fibrillation with documented history of intolerance to anticoagulation she is status post Watchman device, hypertension.  Since last time I saw her she has done well she denies any cardiac complaints chest pain, palpitation, lightheadedness, dizziness or syncope.  She remains active.  She   finished 6 months of dual antiplatelet therapy.  Assessment     1. Persistent atrial fibrillation.  Heart rate controlled.  Unable to tolerate anticoagulation due to recurrent GI bleed  2.  Intolerance to anticoagulation due to GI bleed  3. CHADS2 vascular score of 3   4 hypertension controlled  5.  Status post Watchman device she is on aspirin and Plavix for almost 7-month  6.  Abnormality noted on the lung field but appears to be stable.  I gave the patient copy of her CT scan with advised to repeat her CT scan through her PCP     Plan     1. I advised the patient to stop Plavix and continue aspirin  2.  I reviewed the result of her CT scan and the abnormality that was noted on her lung and I advised her to review with PCP and repeat CT scan I did have a copy of her CT scan  3.   I reviewed her recent hospitalization we will see her back in the office in 6 months we will plan to do an EKG    Review of Systems   Cardiovascular:  Positive for palpitations.   All other systems reviewed and are negative.           Vitals:    02/12/25 1321 02/12/25 1338   BP: (!) 138/100 121/82   BP Location: Left arm Left arm   Patient Position: Sitting Sitting   Pulse: 64    Weight: (!) 44.5 kg (98 lb)    Height: 1.626 m (5' 4\")         Objective   Physical Exam  Constitutional:       Appearance: Normal appearance.   HENT:      Nose: Nose normal.   Neck:      Vascular: No carotid bruit.   Cardiovascular:      Rate and Rhythm: Normal rate. Rhythm irregularly irregular.      Pulses: " Normal pulses.      Heart sounds: Normal heart sounds.   Pulmonary:      Effort: Pulmonary effort is normal.   Abdominal:      General: Bowel sounds are normal.      Palpations: Abdomen is soft.   Musculoskeletal:         General: Normal range of motion.      Cervical back: Normal range of motion.      Right lower leg: No edema.      Left lower leg: No edema.   Skin:     General: Skin is warm and dry.   Neurological:      General: No focal deficit present.      Mental Status: She is alert.   Psychiatric:         Mood and Affect: Mood normal.         Behavior: Behavior normal.         Thought Content: Thought content normal.         Judgment: Judgment normal.         Allergies  Patient has no known allergies.     Current Medications    Current Outpatient Medications:     aspirin 81 mg EC tablet, Take 1 tablet (81 mg) by mouth once daily., Disp: , Rfl:     digoxin (Lanoxin) 125 MCG tablet, Take 1 tablet (125 mcg) by mouth once daily., Disp: 90 tablet, Rfl: 3    metoprolol succinate XL (Toprol XL) 100 mg 24 hr tablet, Take 1 tablet (100 mg) by mouth once daily. Do not crush or chew., Disp: 90 tablet, Rfl: 3                     Assessment/Plan   1. Persistent atrial fibrillation (Multi)  Follow Up In Cardiology    metoprolol succinate XL (Toprol XL) 100 mg 24 hr tablet    digoxin (Lanoxin) 125 MCG tablet      2. Presence of Watchman left atrial appendage closure device  Follow Up In Cardiology      3. Essential hypertension, benign  metoprolol succinate XL (Toprol XL) 100 mg 24 hr tablet      4. Recurrent gastrointestinal hemorrhage        5. BMI less than 19,adult        6. Former smoker                 Scribe Attestation  By signing my name below, Yvonne GOODWIN LPN Scribe   attest that this documentation has been prepared under the direction and in the presence of Jamel Cuevas MD.     Provider Attestation - Scribe documentation    All medical record entries made by the Scribe were at my direction and  personally dictated by me. I have reviewed the chart and agree that the record accurately reflects my personal performance of the history, physical exam, discussion and plan.

## 2025-08-07 ENCOUNTER — APPOINTMENT (OUTPATIENT)
Dept: CARDIOLOGY | Facility: CLINIC | Age: 70
End: 2025-08-07
Payer: MEDICARE

## 2025-08-07 VITALS
HEART RATE: 58 BPM | WEIGHT: 101.6 LBS | BODY MASS INDEX: 17.34 KG/M2 | SYSTOLIC BLOOD PRESSURE: 130 MMHG | DIASTOLIC BLOOD PRESSURE: 64 MMHG | HEIGHT: 64 IN

## 2025-08-07 DIAGNOSIS — Z87.891 FORMER SMOKER: ICD-10-CM

## 2025-08-07 DIAGNOSIS — I10 ESSENTIAL (PRIMARY) HYPERTENSION: ICD-10-CM

## 2025-08-07 DIAGNOSIS — I48.21 PERMANENT ATRIAL FIBRILLATION (MULTI): Primary | ICD-10-CM

## 2025-08-07 DIAGNOSIS — I10 ESSENTIAL HYPERTENSION, BENIGN: ICD-10-CM

## 2025-08-07 DIAGNOSIS — K92.2 RECURRENT GASTROINTESTINAL HEMORRHAGE: ICD-10-CM

## 2025-08-07 DIAGNOSIS — I48.19 PERSISTENT ATRIAL FIBRILLATION (MULTI): ICD-10-CM

## 2025-08-07 DIAGNOSIS — Z95.818 PRESENCE OF WATCHMAN LEFT ATRIAL APPENDAGE CLOSURE DEVICE: ICD-10-CM

## 2025-08-07 RX ORDER — METOPROLOL SUCCINATE 100 MG/1
100 TABLET, EXTENDED RELEASE ORAL DAILY
Qty: 90 TABLET | Refills: 3 | Status: SHIPPED | OUTPATIENT
Start: 2025-08-07 | End: 2026-08-07

## 2025-08-07 NOTE — LETTER
August 7, 2025     Maria Luz Polanco, APRN-CNP  [address]  [facility]  [facility] [address]    Patient: Monse Dillon   YOB: 1955   Date of Visit: 8/7/2025       Dear Dr. Maria Luz Polanco, APRN-CNP:    Thank you for referring Monse Dillon to me for evaluation. Below are my notes for this consultation.  If you have questions, please do not hesitate to call me. I look forward to following your patient along with you.       Sincerely,     Jamel Cuevas MD      CC: No Recipients  ______________________________________________________________________________________    Chief Complaint   Patient presents with   • Follow-up     6 months with ekg       Subjective   Monse Dillon is a 69 y.o. female     HPI        Patient here for history of permanent atrial fibrillation, intolerance to anticoagulation status post Watchman device and hypertension.  Since last time I saw her she denies any cardiac complaint.  She feels well.  She denies complaint of chest pain, palpitation, lightheadedness, dizziness or syncope.  Her EKG showed A-fib with controlled heart rate.    Assessment     1.  Permanent atrial fibrillation.  Heart rate controlled.  Unable to tolerate anticoagulation due to recurrent GI bleed which hampered our effort to restore sinus mechanism.  Decision was made for heart rate control   2.  Intolerance to anticoagulation due to GI bleed  3. CHADS2 vascular score of 3   4 hypertension controlled  5.  Status post Watchman device last year currently on aspirin therapy  6.  Abnormality noted on the lung field but appears to be stable.  I gave the patient copy of her CT scan in the past and she is scheduled to have a repeat CT scan through her PCP     Plan     1. I advised the patient to continue present medical regimen  2.  I advised her to have digoxin level  3.   I encouraged her to keep her appointment with her PCP to follow-up her CT abnormality  4.  I will see her  "back in 8-month and follow-up  Review of Systems   All other systems reviewed and are negative.           Vitals:    08/07/25 1512   BP: 130/64   BP Location: Left arm   Patient Position: Sitting   Pulse: 58   Weight: 46.1 kg (101 lb 9.6 oz)   Height: 1.626 m (5' 4\")        Objective   Physical Exam  Constitutional:       Appearance: Normal appearance.   HENT:      Nose: Nose normal.   Neck:      Vascular: No carotid bruit.     Cardiovascular:      Rate and Rhythm: Normal rate. Rhythm irregularly irregular.      Pulses: Normal pulses.      Heart sounds: Normal heart sounds.   Pulmonary:      Effort: Pulmonary effort is normal.   Abdominal:      General: Bowel sounds are normal.      Palpations: Abdomen is soft.     Musculoskeletal:         General: Normal range of motion.      Cervical back: Normal range of motion.      Right lower leg: No edema.      Left lower leg: No edema.     Skin:     General: Skin is warm and dry.     Neurological:      General: No focal deficit present.      Mental Status: She is alert.     Psychiatric:         Mood and Affect: Mood normal.         Behavior: Behavior normal.         Thought Content: Thought content normal.         Judgment: Judgment normal.         Allergies  Patient has no known allergies.     Current Medications  Current Outpatient Medications   Medication Instructions   • aspirin 81 mg, Daily   • digoxin (LANOXIN) 125 mcg, oral, Daily   • metoprolol succinate XL (TOPROL XL) 100 mg, oral, Daily, Do not crush or chew.                        Assessment/Plan   1. Permanent atrial fibrillation (Multi)  Follow Up In Cardiology    ECG 12 Lead    Digoxin    Digoxin      2. Presence of Watchman left atrial appendage closure device  Follow Up In Cardiology      3. Essential (primary) hypertension        4. Recurrent gastrointestinal hemorrhage        5. BMI less than 19,adult        6. Former smoker        7. Persistent atrial fibrillation (Multi)  metoprolol succinate XL (Toprol " XL) 100 mg 24 hr tablet      8. Essential hypertension, benign  metoprolol succinate XL (Toprol XL) 100 mg 24 hr tablet               Scribe Attestation  By signing my name below, I, Pamela Kaur LPNibsho   attest that this documentation has been prepared under the direction and in the presence of Jamel Cuevas MD.     Provider Attestation - Scribe documentation    All medical record entries made by the Scribe were at my direction and personally dictated by me. I have reviewed the chart and agree that the record accurately reflects my personal performance of the history, physical exam, discussion and plan.

## 2025-08-07 NOTE — PROGRESS NOTES
"Chief Complaint   Patient presents with    Follow-up     6 months with ekg       Subjective   Monse Dillon is a 69 y.o. female     HPI        Patient here for history of permanent atrial fibrillation, intolerance to anticoagulation status post Watchman device and hypertension.  Since last time I saw her she denies any cardiac complaint.  She feels well.  She denies complaint of chest pain, palpitation, lightheadedness, dizziness or syncope.  Her EKG showed A-fib with controlled heart rate.    Assessment     1.  Permanent atrial fibrillation.  Heart rate controlled.  Unable to tolerate anticoagulation due to recurrent GI bleed which hampered our effort to restore sinus mechanism.  Decision was made for heart rate control   2.  Intolerance to anticoagulation due to GI bleed  3. CHADS2 vascular score of 3   4 hypertension controlled  5.  Status post Watchman device last year currently on aspirin therapy  6.  Abnormality noted on the lung field but appears to be stable.  I gave the patient copy of her CT scan in the past and she is scheduled to have a repeat CT scan through her PCP     Plan     1. I advised the patient to continue present medical regimen  2.  I advised her to have digoxin level  3.   I encouraged her to keep her appointment with her PCP to follow-up her CT abnormality  4.  I will see her back in 8-month and follow-up  Review of Systems   All other systems reviewed and are negative.           Vitals:    08/07/25 1512   BP: 130/64   BP Location: Left arm   Patient Position: Sitting   Pulse: 58   Weight: 46.1 kg (101 lb 9.6 oz)   Height: 1.626 m (5' 4\")        Objective   Physical Exam  Constitutional:       Appearance: Normal appearance.   HENT:      Nose: Nose normal.   Neck:      Vascular: No carotid bruit.     Cardiovascular:      Rate and Rhythm: Normal rate. Rhythm irregularly irregular.      Pulses: Normal pulses.      Heart sounds: Normal heart sounds.   Pulmonary:      Effort: Pulmonary effort " is normal.   Abdominal:      General: Bowel sounds are normal.      Palpations: Abdomen is soft.     Musculoskeletal:         General: Normal range of motion.      Cervical back: Normal range of motion.      Right lower leg: No edema.      Left lower leg: No edema.     Skin:     General: Skin is warm and dry.     Neurological:      General: No focal deficit present.      Mental Status: She is alert.     Psychiatric:         Mood and Affect: Mood normal.         Behavior: Behavior normal.         Thought Content: Thought content normal.         Judgment: Judgment normal.         Allergies  Patient has no known allergies.     Current Medications  Current Outpatient Medications   Medication Instructions    aspirin 81 mg, Daily    digoxin (LANOXIN) 125 mcg, oral, Daily    metoprolol succinate XL (TOPROL XL) 100 mg, oral, Daily, Do not crush or chew.                        Assessment/Plan   1. Permanent atrial fibrillation (Multi)  Follow Up In Cardiology    ECG 12 Lead    Digoxin    Digoxin      2. Presence of Watchman left atrial appendage closure device  Follow Up In Cardiology      3. Essential (primary) hypertension        4. Recurrent gastrointestinal hemorrhage        5. BMI less than 19,adult        6. Former smoker        7. Persistent atrial fibrillation (Multi)  metoprolol succinate XL (Toprol XL) 100 mg 24 hr tablet      8. Essential hypertension, benign  metoprolol succinate XL (Toprol XL) 100 mg 24 hr tablet               Scribe Attestation  By signing my name below, IYvonne LPN, Scribe   attest that this documentation has been prepared under the direction and in the presence of Jamel Cuevas MD.     Provider Attestation - Scribe documentation    All medical record entries made by the Scribe were at my direction and personally dictated by me. I have reviewed the chart and agree that the record accurately reflects my personal performance of the history, physical exam, discussion and plan.

## 2025-08-07 NOTE — PATIENT INSTRUCTIONS
Please bring all medicines, vitamins, and herbal supplements with you when you come to the office.    Prescriptions will not be filled unless you are compliant with your follow up appointments or have a follow up appointment scheduled as per instruction of your physician. Refills should be requested at the time of your visit.     BMI was below normal measurement. Current weight: 46.1 kg (101 lb 9.6 oz)  Weight change since last visit (-) denotes wt loss 3.6 lbs   Weight gain needed to achieve  BMI of 18.5 = 5.9 Lbs    Instructed patient to consume high calorie, high protein diet.

## 2026-03-19 ENCOUNTER — APPOINTMENT (OUTPATIENT)
Dept: CARDIOLOGY | Facility: CLINIC | Age: 71
End: 2026-03-19
Payer: MEDICARE

## (undated) DEVICE — CLOSURE SYSTEM, VASCULAR, MVP 6-12FR, VENOUS

## (undated) DEVICE — DEVICE, CLOSURE, PERCLOSE, PROSTYLE

## (undated) DEVICE — SHEATH, PINNACLE, W/O GUIDEWIRE, 8FR INTRODUCER,  8FR DIA, 2.5 CM DILATOR

## (undated) DEVICE — SHEATH, PINNACLE, 10 CM,  8FR INTRODUCER, 8FR DIA, 2.5 CM DIALATOR

## (undated) DEVICE — Device

## (undated) DEVICE — ACCESS KIT, S-MAK MINI, 4FR 10CM 0.018IN 40CM, NT/PT, ECHO ENHANCE NEEDLE

## (undated) DEVICE — PAD, ELECTRODE DEFIB PADPRO ADULT STRL W/ADAPTER

## (undated) DEVICE — VERSACROSS KIT, ACCESS SOLUTION, RF WIRE 180CM

## (undated) DEVICE — GUIDEWIRE, INQWIRE, 3MM J, .035, 150

## (undated) DEVICE — CATHETER, ANGIO, IMPULSE, PIG 155, 6 FR X 110 CM

## (undated) DEVICE — SHEATH, PINNACLE, 10 CM,  7FR INTRODUCER, 7FR DIA, 2.5 CM DIALATOR

## (undated) DEVICE — SYSTEM, ACCESS, FXD DBL CURVE, WATCHMAN